# Patient Record
Sex: MALE | Race: WHITE | NOT HISPANIC OR LATINO | Employment: OTHER | URBAN - METROPOLITAN AREA
[De-identification: names, ages, dates, MRNs, and addresses within clinical notes are randomized per-mention and may not be internally consistent; named-entity substitution may affect disease eponyms.]

---

## 2019-05-30 ENCOUNTER — APPOINTMENT (OUTPATIENT)
Dept: PREADMISSION TESTING | Facility: HOSPITAL | Age: 54
End: 2019-05-30

## 2019-06-14 ENCOUNTER — APPOINTMENT (OUTPATIENT)
Dept: PREADMISSION TESTING | Facility: HOSPITAL | Age: 54
End: 2019-06-14

## 2019-06-14 VITALS — HEIGHT: 67 IN | WEIGHT: 241.84 LBS | BODY MASS INDEX: 37.96 KG/M2

## 2019-06-14 LAB
ABO GROUP BLD: NORMAL
BLD GP AB SCN SERPL QL: NEGATIVE
DEPRECATED RDW RBC AUTO: 44.1 FL (ref 37–54)
ERYTHROCYTE [DISTWIDTH] IN BLOOD BY AUTOMATED COUNT: 13 % (ref 12.3–15.4)
HBA1C MFR BLD: 5.2 % (ref 4.8–5.6)
HCT VFR BLD AUTO: 46.6 % (ref 37.5–51)
HGB BLD-MCNC: 15.3 G/DL (ref 13–17.7)
MCH RBC QN AUTO: 30.4 PG (ref 26.6–33)
MCHC RBC AUTO-ENTMCNC: 32.8 G/DL (ref 31.5–35.7)
MCV RBC AUTO: 92.6 FL (ref 79–97)
PLATELET # BLD AUTO: 207 10*3/MM3 (ref 140–450)
PMV BLD AUTO: 8.8 FL (ref 6–12)
RBC # BLD AUTO: 5.03 10*6/MM3 (ref 4.14–5.8)
RH BLD: POSITIVE
WBC NRBC COR # BLD: 9.95 10*3/MM3 (ref 3.4–10.8)

## 2019-06-14 PROCEDURE — 83036 HEMOGLOBIN GLYCOSYLATED A1C: CPT | Performed by: ANESTHESIOLOGY

## 2019-06-14 PROCEDURE — 36415 COLL VENOUS BLD VENIPUNCTURE: CPT

## 2019-06-14 PROCEDURE — 86850 RBC ANTIBODY SCREEN: CPT | Performed by: ORTHOPAEDIC SURGERY

## 2019-06-14 PROCEDURE — 86901 BLOOD TYPING SEROLOGIC RH(D): CPT | Performed by: ORTHOPAEDIC SURGERY

## 2019-06-14 PROCEDURE — 86900 BLOOD TYPING SEROLOGIC ABO: CPT | Performed by: ORTHOPAEDIC SURGERY

## 2019-06-14 PROCEDURE — 85027 COMPLETE CBC AUTOMATED: CPT | Performed by: ANESTHESIOLOGY

## 2019-06-14 RX ORDER — LEVOTHYROXINE SODIUM 0.05 MG/1
50 TABLET ORAL DAILY
COMMUNITY

## 2019-06-14 RX ORDER — ONDANSETRON 4 MG/1
4 TABLET, FILM COATED ORAL EVERY 8 HOURS PRN
COMMUNITY

## 2019-06-14 RX ORDER — FLUTICASONE PROPIONATE 50 MCG
2 SPRAY, SUSPENSION (ML) NASAL DAILY
COMMUNITY

## 2019-06-14 RX ORDER — METOPROLOL TARTRATE 100 MG/1
100 TABLET ORAL 2 TIMES DAILY
COMMUNITY

## 2019-06-14 RX ORDER — DULOXETIN HYDROCHLORIDE 60 MG/1
60 CAPSULE, DELAYED RELEASE ORAL DAILY
COMMUNITY

## 2019-06-14 RX ORDER — SIMVASTATIN 20 MG
20 TABLET ORAL DAILY
COMMUNITY

## 2019-06-14 RX ORDER — ALLOPURINOL 300 MG/1
300 TABLET ORAL DAILY
COMMUNITY

## 2019-06-14 RX ORDER — CYCLOBENZAPRINE HCL 10 MG
10 TABLET ORAL 3 TIMES DAILY PRN
COMMUNITY

## 2019-06-14 NOTE — PAT
Patient instructed to drink 20 ounces (or until full) of Gatorade or 20 ounces of G2 (if diabetic) and complete 1 hour before arrival time for procedure (NO RED Gatorade or G2)    Patient verbalized understanding.      T&s too early for pat so please do in preop.       Potassium dos as pt takes antihypertensives.      Per Anesthesia Request, patient instructed not to take their ACE/ARB medications on the AM of surgery.

## 2019-06-18 ENCOUNTER — ANESTHESIA EVENT (OUTPATIENT)
Dept: PERIOP | Facility: HOSPITAL | Age: 54
End: 2019-06-18

## 2019-06-19 ENCOUNTER — HOSPITAL ENCOUNTER (INPATIENT)
Facility: HOSPITAL | Age: 54
LOS: 3 days | Discharge: HOME HEALTH | End: 2019-06-22
Attending: ORTHOPAEDIC SURGERY | Admitting: ORTHOPAEDIC SURGERY

## 2019-06-19 ENCOUNTER — ANESTHESIA (OUTPATIENT)
Dept: PERIOP | Facility: HOSPITAL | Age: 54
End: 2019-06-19

## 2019-06-19 ENCOUNTER — APPOINTMENT (OUTPATIENT)
Dept: GENERAL RADIOLOGY | Facility: HOSPITAL | Age: 54
End: 2019-06-19

## 2019-06-19 DIAGNOSIS — Z98.1 S/P LUMBAR FUSION: ICD-10-CM

## 2019-06-19 DIAGNOSIS — Z74.09 IMPAIRED MOBILITY AND ADLS: ICD-10-CM

## 2019-06-19 DIAGNOSIS — Z78.9 IMPAIRED MOBILITY AND ADLS: ICD-10-CM

## 2019-06-19 DIAGNOSIS — Z74.09 IMPAIRED FUNCTIONAL MOBILITY, BALANCE, GAIT, AND ENDURANCE: Primary | ICD-10-CM

## 2019-06-19 PROBLEM — E78.5 HLD (HYPERLIPIDEMIA): Status: ACTIVE | Noted: 2019-06-19

## 2019-06-19 PROBLEM — M54.9 BACK PAIN: Status: ACTIVE | Noted: 2019-06-19

## 2019-06-19 LAB
ABO GROUP BLD: NORMAL
BLD GP AB SCN SERPL QL: NEGATIVE
POTASSIUM BLD-SCNC: 4 MMOL/L (ref 3.5–5.2)
RH BLD: POSITIVE
T&S EXPIRATION DATE: NORMAL

## 2019-06-19 PROCEDURE — 25010000002 PHENYLEPHRINE PER 1 ML: Performed by: NURSE ANESTHETIST, CERTIFIED REGISTERED

## 2019-06-19 PROCEDURE — 84132 ASSAY OF SERUM POTASSIUM: CPT | Performed by: ANESTHESIOLOGY

## 2019-06-19 PROCEDURE — 25010000002 SUCCINYLCHOLINE PER 20 MG: Performed by: NURSE ANESTHETIST, CERTIFIED REGISTERED

## 2019-06-19 PROCEDURE — 25010000002 HYDROMORPHONE 1 MG/ML SOLUTION: Performed by: ORTHOPAEDIC SURGERY

## 2019-06-19 PROCEDURE — 25010000002 HYDROMORPHONE PER 4 MG: Performed by: NURSE ANESTHETIST, CERTIFIED REGISTERED

## 2019-06-19 PROCEDURE — C1713 ANCHOR/SCREW BN/BN,TIS/BN: HCPCS | Performed by: ORTHOPAEDIC SURGERY

## 2019-06-19 PROCEDURE — 97116 GAIT TRAINING THERAPY: CPT

## 2019-06-19 PROCEDURE — 86900 BLOOD TYPING SEROLOGIC ABO: CPT | Performed by: ORTHOPAEDIC SURGERY

## 2019-06-19 PROCEDURE — 25010000002 PROPOFOL 10 MG/ML EMULSION: Performed by: NURSE ANESTHETIST, CERTIFIED REGISTERED

## 2019-06-19 PROCEDURE — P9041 ALBUMIN (HUMAN),5%, 50ML: HCPCS | Performed by: NURSE ANESTHETIST, CERTIFIED REGISTERED

## 2019-06-19 PROCEDURE — 0SG0071 FUSION OF LUMBAR VERTEBRAL JOINT WITH AUTOLOGOUS TISSUE SUBSTITUTE, POSTERIOR APPROACH, POSTERIOR COLUMN, OPEN APPROACH: ICD-10-PCS | Performed by: ORTHOPAEDIC SURGERY

## 2019-06-19 PROCEDURE — 25010000002 ALBUMIN HUMAN 5% PER 50 ML: Performed by: NURSE ANESTHETIST, CERTIFIED REGISTERED

## 2019-06-19 PROCEDURE — 76000 FLUOROSCOPY <1 HR PHYS/QHP: CPT

## 2019-06-19 PROCEDURE — 25010000002 DEXAMETHASONE PER 1 MG: Performed by: NURSE ANESTHETIST, CERTIFIED REGISTERED

## 2019-06-19 PROCEDURE — 25810000003 SODIUM CHLORIDE 0.9 % WITH KCL 20 MEQ 20-0.9 MEQ/L-% SOLUTION: Performed by: ORTHOPAEDIC SURGERY

## 2019-06-19 PROCEDURE — 97161 PT EVAL LOW COMPLEX 20 MIN: CPT

## 2019-06-19 PROCEDURE — 0SG00AJ FUSION OF LUMBAR VERTEBRAL JOINT WITH INTERBODY FUSION DEVICE, POSTERIOR APPROACH, ANTERIOR COLUMN, OPEN APPROACH: ICD-10-PCS | Performed by: ORTHOPAEDIC SURGERY

## 2019-06-19 PROCEDURE — 25010000002 MORPHINE PER 10 MG: Performed by: ORTHOPAEDIC SURGERY

## 2019-06-19 PROCEDURE — 25010000002 ONDANSETRON PER 1 MG: Performed by: NURSE ANESTHETIST, CERTIFIED REGISTERED

## 2019-06-19 PROCEDURE — 0ST20ZZ RESECTION OF LUMBAR VERTEBRAL DISC, OPEN APPROACH: ICD-10-PCS | Performed by: ORTHOPAEDIC SURGERY

## 2019-06-19 PROCEDURE — 86901 BLOOD TYPING SEROLOGIC RH(D): CPT | Performed by: ORTHOPAEDIC SURGERY

## 2019-06-19 PROCEDURE — 86850 RBC ANTIBODY SCREEN: CPT | Performed by: ORTHOPAEDIC SURGERY

## 2019-06-19 PROCEDURE — 25010000002 VANCOMYCIN 10 G RECONSTITUTED SOLUTION: Performed by: ORTHOPAEDIC SURGERY

## 2019-06-19 DEVICE — SCRW CORT VIPER PLS5.5 TI FIX 6X45MM: Type: IMPLANTABLE DEVICE | Status: FUNCTIONAL

## 2019-06-19 DEVICE — SCRW CORT VIPER PLS5.5 TI FIX 6X40MM: Type: IMPLANTABLE DEVICE | Status: FUNCTIONAL

## 2019-06-19 DEVICE — SPACR OPAL REVOLVE 10X28X11MM: Type: IMPLANTABLE DEVICE | Status: FUNCTIONAL

## 2019-06-19 DEVICE — ROD PREBNT SPINE EXPEDIUM TI 5.5X35MM: Type: IMPLANTABLE DEVICE | Status: FUNCTIONAL

## 2019-06-19 DEVICE — ALLOGRFT BONE VIVIGEN CELLUAR MATRX FORMABLE 5CC: Type: IMPLANTABLE DEVICE | Site: SPINE LUMBAR | Status: FUNCTIONAL

## 2019-06-19 RX ORDER — PROMETHAZINE HYDROCHLORIDE 25 MG/ML
6.25 INJECTION, SOLUTION INTRAMUSCULAR; INTRAVENOUS ONCE AS NEEDED
Status: DISCONTINUED | OUTPATIENT
Start: 2019-06-19 | End: 2019-06-19 | Stop reason: HOSPADM

## 2019-06-19 RX ORDER — ONDANSETRON 2 MG/ML
4 INJECTION INTRAMUSCULAR; INTRAVENOUS EVERY 6 HOURS PRN
Status: DISCONTINUED | OUTPATIENT
Start: 2019-06-19 | End: 2019-06-22 | Stop reason: HOSPADM

## 2019-06-19 RX ORDER — ONDANSETRON 2 MG/ML
4 INJECTION INTRAMUSCULAR; INTRAVENOUS EVERY 6 HOURS PRN
Status: DISCONTINUED | OUTPATIENT
Start: 2019-06-19 | End: 2019-06-19 | Stop reason: SDUPTHER

## 2019-06-19 RX ORDER — DIPHENHYDRAMINE HCL 50 MG
50 CAPSULE ORAL EVERY 6 HOURS PRN
Status: DISCONTINUED | OUTPATIENT
Start: 2019-06-19 | End: 2019-06-22 | Stop reason: HOSPADM

## 2019-06-19 RX ORDER — BISACODYL 5 MG/1
5 TABLET, DELAYED RELEASE ORAL DAILY PRN
Status: DISCONTINUED | OUTPATIENT
Start: 2019-06-19 | End: 2019-06-22 | Stop reason: HOSPADM

## 2019-06-19 RX ORDER — GLYCOPYRROLATE 0.2 MG/ML
INJECTION INTRAMUSCULAR; INTRAVENOUS AS NEEDED
Status: DISCONTINUED | OUTPATIENT
Start: 2019-06-19 | End: 2019-06-19 | Stop reason: SURG

## 2019-06-19 RX ORDER — LEVOTHYROXINE SODIUM 0.05 MG/1
50 TABLET ORAL
Status: DISCONTINUED | OUTPATIENT
Start: 2019-06-20 | End: 2019-06-22 | Stop reason: HOSPADM

## 2019-06-19 RX ORDER — OXYCODONE AND ACETAMINOPHEN 10; 325 MG/1; MG/1
1 TABLET ORAL EVERY 4 HOURS PRN
Status: DISCONTINUED | OUTPATIENT
Start: 2019-06-19 | End: 2019-06-22 | Stop reason: HOSPADM

## 2019-06-19 RX ORDER — VECURONIUM BROMIDE 1 MG/ML
INJECTION, POWDER, LYOPHILIZED, FOR SOLUTION INTRAVENOUS AS NEEDED
Status: DISCONTINUED | OUTPATIENT
Start: 2019-06-19 | End: 2019-06-19 | Stop reason: SURG

## 2019-06-19 RX ORDER — ZOLPIDEM TARTRATE 5 MG/1
5 TABLET ORAL NIGHTLY PRN
Status: DISCONTINUED | OUTPATIENT
Start: 2019-06-19 | End: 2019-06-22 | Stop reason: HOSPADM

## 2019-06-19 RX ORDER — MORPHINE SULFATE 2 MG/ML
1 INJECTION, SOLUTION INTRAMUSCULAR; INTRAVENOUS EVERY 4 HOURS PRN
Status: DISCONTINUED | OUTPATIENT
Start: 2019-06-19 | End: 2019-06-22 | Stop reason: HOSPADM

## 2019-06-19 RX ORDER — LIDOCAINE HYDROCHLORIDE 10 MG/ML
0.5 INJECTION, SOLUTION EPIDURAL; INFILTRATION; INTRACAUDAL; PERINEURAL ONCE AS NEEDED
Status: COMPLETED | OUTPATIENT
Start: 2019-06-19 | End: 2019-06-19

## 2019-06-19 RX ORDER — ESMOLOL HYDROCHLORIDE 10 MG/ML
INJECTION INTRAVENOUS AS NEEDED
Status: DISCONTINUED | OUTPATIENT
Start: 2019-06-19 | End: 2019-06-19 | Stop reason: SURG

## 2019-06-19 RX ORDER — FENTANYL CITRATE 50 UG/ML
50 INJECTION, SOLUTION INTRAMUSCULAR; INTRAVENOUS
Status: DISCONTINUED | OUTPATIENT
Start: 2019-06-19 | End: 2019-06-19 | Stop reason: HOSPADM

## 2019-06-19 RX ORDER — LISINOPRIL 10 MG/1
10 TABLET ORAL DAILY
Status: DISCONTINUED | OUTPATIENT
Start: 2019-06-19 | End: 2019-06-22 | Stop reason: HOSPADM

## 2019-06-19 RX ORDER — METOPROLOL TARTRATE 100 MG/1
100 TABLET ORAL EVERY 12 HOURS SCHEDULED
Status: DISCONTINUED | OUTPATIENT
Start: 2019-06-19 | End: 2019-06-22 | Stop reason: HOSPADM

## 2019-06-19 RX ORDER — BUPIVACAINE HYDROCHLORIDE AND EPINEPHRINE 2.5; 5 MG/ML; UG/ML
INJECTION, SOLUTION EPIDURAL; INFILTRATION; INTRACAUDAL; PERINEURAL AS NEEDED
Status: DISCONTINUED | OUTPATIENT
Start: 2019-06-19 | End: 2019-06-19 | Stop reason: HOSPADM

## 2019-06-19 RX ORDER — PROMETHAZINE HYDROCHLORIDE 25 MG/1
25 SUPPOSITORY RECTAL ONCE AS NEEDED
Status: DISCONTINUED | OUTPATIENT
Start: 2019-06-19 | End: 2019-06-19 | Stop reason: HOSPADM

## 2019-06-19 RX ORDER — PROMETHAZINE HYDROCHLORIDE 25 MG/1
25 TABLET ORAL ONCE AS NEEDED
Status: DISCONTINUED | OUTPATIENT
Start: 2019-06-19 | End: 2019-06-19 | Stop reason: HOSPADM

## 2019-06-19 RX ORDER — NALOXONE HCL 0.4 MG/ML
0.4 VIAL (ML) INJECTION
Status: DISCONTINUED | OUTPATIENT
Start: 2019-06-19 | End: 2019-06-22 | Stop reason: HOSPADM

## 2019-06-19 RX ORDER — SODIUM CHLORIDE, SODIUM LACTATE, POTASSIUM CHLORIDE, CALCIUM CHLORIDE 600; 310; 30; 20 MG/100ML; MG/100ML; MG/100ML; MG/100ML
9 INJECTION, SOLUTION INTRAVENOUS CONTINUOUS PRN
Status: DISCONTINUED | OUTPATIENT
Start: 2019-06-19 | End: 2019-06-19 | Stop reason: HOSPADM

## 2019-06-19 RX ORDER — DEXAMETHASONE SODIUM PHOSPHATE 4 MG/ML
INJECTION, SOLUTION INTRA-ARTICULAR; INTRALESIONAL; INTRAMUSCULAR; INTRAVENOUS; SOFT TISSUE AS NEEDED
Status: DISCONTINUED | OUTPATIENT
Start: 2019-06-19 | End: 2019-06-19 | Stop reason: SURG

## 2019-06-19 RX ORDER — ACETAMINOPHEN 500 MG
1000 TABLET ORAL ONCE
Status: COMPLETED | OUTPATIENT
Start: 2019-06-19 | End: 2019-06-19

## 2019-06-19 RX ORDER — PROPOFOL 10 MG/ML
VIAL (ML) INTRAVENOUS AS NEEDED
Status: DISCONTINUED | OUTPATIENT
Start: 2019-06-19 | End: 2019-06-19 | Stop reason: SURG

## 2019-06-19 RX ORDER — COLCHICINE 0.6 MG/1
0.6 TABLET ORAL DAILY
Status: DISCONTINUED | OUTPATIENT
Start: 2019-06-19 | End: 2019-06-22 | Stop reason: HOSPADM

## 2019-06-19 RX ORDER — SODIUM CHLORIDE 0.9 % (FLUSH) 0.9 %
3-10 SYRINGE (ML) INJECTION AS NEEDED
Status: DISCONTINUED | OUTPATIENT
Start: 2019-06-19 | End: 2019-06-22 | Stop reason: HOSPADM

## 2019-06-19 RX ORDER — SUFENTANIL CITRATE 50 UG/ML
INJECTION EPIDURAL; INTRAVENOUS AS NEEDED
Status: DISCONTINUED | OUTPATIENT
Start: 2019-06-19 | End: 2019-06-19 | Stop reason: SURG

## 2019-06-19 RX ORDER — FAMOTIDINE 10 MG/ML
20 INJECTION, SOLUTION INTRAVENOUS EVERY 12 HOURS SCHEDULED
Status: DISCONTINUED | OUTPATIENT
Start: 2019-06-19 | End: 2019-06-22 | Stop reason: HOSPADM

## 2019-06-19 RX ORDER — CETIRIZINE HYDROCHLORIDE 10 MG/1
10 TABLET ORAL DAILY
Status: DISCONTINUED | OUTPATIENT
Start: 2019-06-19 | End: 2019-06-22 | Stop reason: HOSPADM

## 2019-06-19 RX ORDER — SUCCINYLCHOLINE CHLORIDE 20 MG/ML
INJECTION INTRAMUSCULAR; INTRAVENOUS AS NEEDED
Status: DISCONTINUED | OUTPATIENT
Start: 2019-06-19 | End: 2019-06-19 | Stop reason: SURG

## 2019-06-19 RX ORDER — CYCLOBENZAPRINE HCL 10 MG
10 TABLET ORAL 3 TIMES DAILY PRN
Status: DISCONTINUED | OUTPATIENT
Start: 2019-06-19 | End: 2019-06-22 | Stop reason: HOSPADM

## 2019-06-19 RX ORDER — DULOXETIN HYDROCHLORIDE 60 MG/1
60 CAPSULE, DELAYED RELEASE ORAL DAILY
Status: DISCONTINUED | OUTPATIENT
Start: 2019-06-20 | End: 2019-06-22 | Stop reason: HOSPADM

## 2019-06-19 RX ORDER — SODIUM CHLORIDE AND POTASSIUM CHLORIDE 150; 900 MG/100ML; MG/100ML
100 INJECTION, SOLUTION INTRAVENOUS CONTINUOUS
Status: DISCONTINUED | OUTPATIENT
Start: 2019-06-19 | End: 2019-06-22 | Stop reason: HOSPADM

## 2019-06-19 RX ORDER — SODIUM CHLORIDE 0.9 % (FLUSH) 0.9 %
3 SYRINGE (ML) INJECTION EVERY 12 HOURS SCHEDULED
Status: DISCONTINUED | OUTPATIENT
Start: 2019-06-19 | End: 2019-06-19 | Stop reason: HOSPADM

## 2019-06-19 RX ORDER — FLUTICASONE PROPIONATE 50 MCG
2 SPRAY, SUSPENSION (ML) NASAL DAILY PRN
Status: DISCONTINUED | OUTPATIENT
Start: 2019-06-19 | End: 2019-06-22 | Stop reason: HOSPADM

## 2019-06-19 RX ORDER — PREGABALIN 75 MG/1
75 CAPSULE ORAL ONCE
Status: COMPLETED | OUTPATIENT
Start: 2019-06-19 | End: 2019-06-19

## 2019-06-19 RX ORDER — ONDANSETRON 2 MG/ML
INJECTION INTRAMUSCULAR; INTRAVENOUS AS NEEDED
Status: DISCONTINUED | OUTPATIENT
Start: 2019-06-19 | End: 2019-06-19 | Stop reason: SURG

## 2019-06-19 RX ORDER — LIDOCAINE HYDROCHLORIDE 10 MG/ML
INJECTION, SOLUTION INFILTRATION; PERINEURAL AS NEEDED
Status: DISCONTINUED | OUTPATIENT
Start: 2019-06-19 | End: 2019-06-19 | Stop reason: SURG

## 2019-06-19 RX ORDER — OXYCODONE HCL 10 MG/1
10 TABLET, FILM COATED, EXTENDED RELEASE ORAL ONCE
Status: COMPLETED | OUTPATIENT
Start: 2019-06-19 | End: 2019-06-19

## 2019-06-19 RX ORDER — SODIUM CHLORIDE 0.9 % (FLUSH) 0.9 %
3 SYRINGE (ML) INJECTION EVERY 12 HOURS SCHEDULED
Status: DISCONTINUED | OUTPATIENT
Start: 2019-06-19 | End: 2019-06-22 | Stop reason: HOSPADM

## 2019-06-19 RX ORDER — ATORVASTATIN CALCIUM 10 MG/1
10 TABLET, FILM COATED ORAL NIGHTLY
Status: DISCONTINUED | OUTPATIENT
Start: 2019-06-19 | End: 2019-06-22 | Stop reason: HOSPADM

## 2019-06-19 RX ORDER — ALBUMIN, HUMAN INJ 5% 5 %
SOLUTION INTRAVENOUS CONTINUOUS PRN
Status: DISCONTINUED | OUTPATIENT
Start: 2019-06-19 | End: 2019-06-19 | Stop reason: SURG

## 2019-06-19 RX ORDER — LABETALOL HYDROCHLORIDE 5 MG/ML
10 INJECTION, SOLUTION INTRAVENOUS EVERY 4 HOURS PRN
Status: DISCONTINUED | OUTPATIENT
Start: 2019-06-19 | End: 2019-06-22 | Stop reason: HOSPADM

## 2019-06-19 RX ORDER — ALLOPURINOL 300 MG/1
300 TABLET ORAL DAILY
Status: DISCONTINUED | OUTPATIENT
Start: 2019-06-19 | End: 2019-06-22 | Stop reason: HOSPADM

## 2019-06-19 RX ORDER — BISACODYL 10 MG
10 SUPPOSITORY, RECTAL RECTAL DAILY PRN
Status: DISCONTINUED | OUTPATIENT
Start: 2019-06-19 | End: 2019-06-22 | Stop reason: HOSPADM

## 2019-06-19 RX ORDER — ACETAMINOPHEN 325 MG/1
650 TABLET ORAL EVERY 4 HOURS PRN
Status: DISCONTINUED | OUTPATIENT
Start: 2019-06-19 | End: 2019-06-22 | Stop reason: HOSPADM

## 2019-06-19 RX ORDER — NEOSTIGMINE METHYLSULFATE 5 MG/5 ML
SYRINGE (ML) INTRAVENOUS AS NEEDED
Status: DISCONTINUED | OUTPATIENT
Start: 2019-06-19 | End: 2019-06-19 | Stop reason: SURG

## 2019-06-19 RX ORDER — HYDROMORPHONE HYDROCHLORIDE 1 MG/ML
0.5 INJECTION, SOLUTION INTRAMUSCULAR; INTRAVENOUS; SUBCUTANEOUS
Status: DISCONTINUED | OUTPATIENT
Start: 2019-06-19 | End: 2019-06-19 | Stop reason: HOSPADM

## 2019-06-19 RX ORDER — SODIUM CHLORIDE 0.9 % (FLUSH) 0.9 %
1-10 SYRINGE (ML) INJECTION AS NEEDED
Status: DISCONTINUED | OUTPATIENT
Start: 2019-06-19 | End: 2019-06-19 | Stop reason: HOSPADM

## 2019-06-19 RX ORDER — FAMOTIDINE 20 MG/1
20 TABLET, FILM COATED ORAL
Status: DISCONTINUED | OUTPATIENT
Start: 2019-06-19 | End: 2019-06-19 | Stop reason: HOSPADM

## 2019-06-19 RX ORDER — FAMOTIDINE 20 MG/1
20 TABLET, FILM COATED ORAL EVERY 12 HOURS SCHEDULED
Status: DISCONTINUED | OUTPATIENT
Start: 2019-06-19 | End: 2019-06-22 | Stop reason: HOSPADM

## 2019-06-19 RX ADMIN — Medication 3 MG: at 10:54

## 2019-06-19 RX ADMIN — PHENYLEPHRINE HYDROCHLORIDE 200 MCG: 10 INJECTION INTRAVENOUS at 08:34

## 2019-06-19 RX ADMIN — ACETAMINOPHEN 1000 MG: 500 TABLET ORAL at 06:44

## 2019-06-19 RX ADMIN — ALLOPURINOL 300 MG: 300 TABLET ORAL at 13:58

## 2019-06-19 RX ADMIN — HYDROMORPHONE HYDROCHLORIDE 0.5 MG: 1 INJECTION, SOLUTION INTRAMUSCULAR; INTRAVENOUS; SUBCUTANEOUS at 11:51

## 2019-06-19 RX ADMIN — METOPROLOL TARTRATE 100 MG: 100 TABLET ORAL at 20:44

## 2019-06-19 RX ADMIN — OXYCODONE HYDROCHLORIDE 10 MG: 10 TABLET, FILM COATED, EXTENDED RELEASE ORAL at 06:44

## 2019-06-19 RX ADMIN — METOPROLOL TARTRATE 100 MG: 100 TABLET ORAL at 13:58

## 2019-06-19 RX ADMIN — MUPIROCIN 1 APPLICATION: 20 OINTMENT TOPICAL at 06:44

## 2019-06-19 RX ADMIN — PHENYLEPHRINE HYDROCHLORIDE 0.5 MCG/KG/MIN: 10 INJECTION INTRAVENOUS at 08:38

## 2019-06-19 RX ADMIN — CETIRIZINE HYDROCHLORIDE 10 MG: 10 TABLET, FILM COATED ORAL at 13:58

## 2019-06-19 RX ADMIN — HYDROMORPHONE HYDROCHLORIDE 0.5 MG: 1 INJECTION, SOLUTION INTRAMUSCULAR; INTRAVENOUS; SUBCUTANEOUS at 11:39

## 2019-06-19 RX ADMIN — ATORVASTATIN CALCIUM 10 MG: 10 TABLET, FILM COATED ORAL at 20:44

## 2019-06-19 RX ADMIN — SUCCINYLCHOLINE CHLORIDE 200 MG: 20 INJECTION, SOLUTION INTRAMUSCULAR; INTRAVENOUS; PARENTERAL at 07:39

## 2019-06-19 RX ADMIN — VECURONIUM BROMIDE 2 MG: 1 INJECTION, POWDER, LYOPHILIZED, FOR SOLUTION INTRAVENOUS at 07:39

## 2019-06-19 RX ADMIN — OXYCODONE HYDROCHLORIDE AND ACETAMINOPHEN 1 TABLET: 10; 325 TABLET ORAL at 22:03

## 2019-06-19 RX ADMIN — ALBUMIN HUMAN: 0.05 INJECTION, SOLUTION INTRAVENOUS at 10:29

## 2019-06-19 RX ADMIN — FAMOTIDINE 20 MG: 20 TABLET, FILM COATED ORAL at 06:44

## 2019-06-19 RX ADMIN — ONDANSETRON 4 MG: 2 INJECTION INTRAMUSCULAR; INTRAVENOUS at 10:48

## 2019-06-19 RX ADMIN — SODIUM CHLORIDE, POTASSIUM CHLORIDE, SODIUM LACTATE AND CALCIUM CHLORIDE 9 ML/HR: 600; 310; 30; 20 INJECTION, SOLUTION INTRAVENOUS at 07:01

## 2019-06-19 RX ADMIN — PHENYLEPHRINE HYDROCHLORIDE 100 MCG: 10 INJECTION INTRAVENOUS at 08:12

## 2019-06-19 RX ADMIN — LISINOPRIL 10 MG: 10 TABLET ORAL at 13:58

## 2019-06-19 RX ADMIN — SUFENTANIL CITRATE 10 MCG: 50 INJECTION EPIDURAL; INTRAVENOUS at 11:18

## 2019-06-19 RX ADMIN — DEXAMETHASONE SODIUM PHOSPHATE 8 MG: 4 INJECTION, SOLUTION INTRAMUSCULAR; INTRAVENOUS at 07:55

## 2019-06-19 RX ADMIN — PHENYLEPHRINE HYDROCHLORIDE 200 MCG: 10 INJECTION INTRAVENOUS at 08:15

## 2019-06-19 RX ADMIN — LIDOCAINE HYDROCHLORIDE 0.3 ML: 10 INJECTION, SOLUTION EPIDURAL; INFILTRATION; INTRACAUDAL; PERINEURAL at 06:44

## 2019-06-19 RX ADMIN — HYDROMORPHONE HYDROCHLORIDE 0.5 MG: 1 INJECTION, SOLUTION INTRAMUSCULAR; INTRAVENOUS; SUBCUTANEOUS at 12:19

## 2019-06-19 RX ADMIN — PHENYLEPHRINE HYDROCHLORIDE 200 MCG: 10 INJECTION INTRAVENOUS at 08:35

## 2019-06-19 RX ADMIN — ALBUMIN HUMAN: 0.05 INJECTION, SOLUTION INTRAVENOUS at 09:32

## 2019-06-19 RX ADMIN — OXYCODONE HYDROCHLORIDE AND ACETAMINOPHEN 1 TABLET: 10; 325 TABLET ORAL at 17:44

## 2019-06-19 RX ADMIN — VANCOMYCIN HYDROCHLORIDE 1750 MG: 10 INJECTION, POWDER, LYOPHILIZED, FOR SOLUTION INTRAVENOUS at 06:58

## 2019-06-19 RX ADMIN — POTASSIUM CHLORIDE AND SODIUM CHLORIDE 100 ML/HR: 900; 150 INJECTION, SOLUTION INTRAVENOUS at 12:56

## 2019-06-19 RX ADMIN — VANCOMYCIN HYDROCHLORIDE 1750 MG: 10 INJECTION, POWDER, LYOPHILIZED, FOR SOLUTION INTRAVENOUS at 20:44

## 2019-06-19 RX ADMIN — PREGABALIN 75 MG: 75 CAPSULE ORAL at 06:44

## 2019-06-19 RX ADMIN — GLYCOPYRROLATE 0.4 MG: 0.2 INJECTION, SOLUTION INTRAMUSCULAR; INTRAVENOUS at 10:54

## 2019-06-19 RX ADMIN — OXYCODONE HYDROCHLORIDE AND ACETAMINOPHEN 1 TABLET: 10; 325 TABLET ORAL at 14:01

## 2019-06-19 RX ADMIN — MORPHINE SULFATE 1 MG: 2 INJECTION, SOLUTION INTRAMUSCULAR; INTRAVENOUS at 20:44

## 2019-06-19 RX ADMIN — ESMOLOL HYDROCHLORIDE 30 MG: 10 INJECTION INTRAVENOUS at 07:39

## 2019-06-19 RX ADMIN — SUFENTANIL CITRATE 20 MCG: 50 INJECTION EPIDURAL; INTRAVENOUS at 08:04

## 2019-06-19 RX ADMIN — COLCHICINE 0.6 MG: 0.6 TABLET, FILM COATED ORAL at 13:58

## 2019-06-19 RX ADMIN — VECURONIUM BROMIDE 4 MG: 1 INJECTION, POWDER, LYOPHILIZED, FOR SOLUTION INTRAVENOUS at 07:50

## 2019-06-19 RX ADMIN — LIDOCAINE HYDROCHLORIDE 100 MG: 10 INJECTION, SOLUTION INFILTRATION; PERINEURAL at 07:39

## 2019-06-19 RX ADMIN — PROPOFOL 200 MG: 10 INJECTION, EMULSION INTRAVENOUS at 07:39

## 2019-06-19 RX ADMIN — HYDROMORPHONE HYDROCHLORIDE 1 MG: 1 INJECTION, SOLUTION INTRAMUSCULAR; INTRAVENOUS; SUBCUTANEOUS at 15:26

## 2019-06-19 RX ADMIN — FAMOTIDINE 20 MG: 20 TABLET ORAL at 13:58

## 2019-06-19 RX ADMIN — FAMOTIDINE 20 MG: 20 TABLET ORAL at 20:44

## 2019-06-19 RX ADMIN — SODIUM CHLORIDE, PRESERVATIVE FREE 3 ML: 5 INJECTION INTRAVENOUS at 14:01

## 2019-06-19 RX ADMIN — SODIUM CHLORIDE, POTASSIUM CHLORIDE, SODIUM LACTATE AND CALCIUM CHLORIDE: 600; 310; 30; 20 INJECTION, SOLUTION INTRAVENOUS at 10:16

## 2019-06-19 RX ADMIN — MORPHINE SULFATE 1 MG: 2 INJECTION, SOLUTION INTRAMUSCULAR; INTRAVENOUS at 12:55

## 2019-06-19 NOTE — ANESTHESIA PROCEDURE NOTES
Airway  Urgency: elective    Airway not difficult    General Information and Staff    Patient location during procedure: OR  CRNA: Judi Modi CRNA    Indications and Patient Condition  Indications for airway management: airway protection    Preoxygenated: yes  MILS maintained throughout  Mask difficulty assessment: 0 - not attempted    Final Airway Details  Final airway type: endotracheal airway      Successful airway: ETT  Cuffed: yes   Successful intubation technique: direct laryngoscopy  Facilitating devices/methods: intubating stylet  Endotracheal tube insertion site: oral  Blade: Yoo  Blade size: 2  ETT size (mm): 8.0  Cormack-Lehane Classification: grade I - full view of glottis  Placement verified by: chest auscultation and capnometry   Cuff volume (mL): 6  Measured from: lips  ETT to lips (cm): 22  Number of attempts at approach: 1    Additional Comments  Pt to OR 13. Pt supine on stretcher, bilateral arms to side.  ASA monitors placed. Pre-O2 with 100% oxygen. SIVI with RSI secondary to potential difficult airway.  Atraumatic intubation. +ETCO2. +BBS. Pt positioned prone with surgeon and OR staff assistance after OETT secured.

## 2019-06-19 NOTE — ANESTHESIA PREPROCEDURE EVALUATION
Anesthesia Evaluation     Patient summary reviewed and Nursing notes reviewed   NPO Solid Status: > 8 hours  NPO Liquid Status: > 8 hours           Airway   Mallampati: III  TM distance: >3 FB  Neck ROM: limited  Possible difficult intubation  Dental      Pulmonary    (+) sleep apnea (probable),   (-) COPD, asthma, shortness of breath, not a smoker  Cardiovascular     Patient on routine beta blocker    (+) hypertension,   (-) past MI, CAD, dysrhythmias, angina, cardiac stents, CABG      Neuro/Psych  (-) seizures, CVA    ROS Comment: spondylolisthesis  GI/Hepatic/Renal/Endo    (+) morbid obesity,  hypothyroidism,   (-) liver disease, no renal disease, diabetes    Musculoskeletal     Abdominal    Substance History      OB/GYN          Other   (+) arthritis (rheuimatoid? )     ROS/Med Hx Other: Sp hip knee surg   avoided Propofol due to egg allergy according to patient     Last Hip surgery had propofol infusion documented                  Anesthesia Plan    ASA 3     general   (Glidescope )  intravenous induction   Anesthetic plan, all risks, benefits, and alternatives have been provided, discussed and informed consent has been obtained with: patient.    Plan discussed with CRNA.

## 2019-06-19 NOTE — H&P
Patient Name: Mckay Hunt  MRN: [id number]  : 1965  DOS: 2019    Attending: Milan Valdez MD    Primary Care Provider: Malick Cruz DO      Chief complaint:  Low back pain    Subjective   Patient is a 53 y.o. male presented for lumbar fusion by Dr. Valdez.    He underwent surgery under GA. He tolerated surgery well and is admitted for further medical management.     He is known to us from previous admission for left hip replacement in Dec 2016; which he recovered well.    PROCEDURE PERFORMED:  1.  Transforaminal lumbar interbody fusion, L4-L5, with DePuy interbody fusion cage and bone graft.  2.  Segmental instrumentation at L4-L5 with DePuy transpedicular fixation.  3.  Posterolateral fusion at L4-L5 with bone graft.  4.  Alatorre Barnes type osteotomy at L4-L5 to facilitate restoration of lordosis and reduction of spondylolisthesis.  5.  Harvesting of bone graft locally from spinous processes and lamina.    When seen postop he doing well. He is having moderate back pain. He denies nausea, shortness of breath or chest pain. No hx of DVT or PE.    (Above is noted, agree.  Doing well when seen in his room afterwards.  Some postoperative pain.  No pain down the lower extremities.  No shortness breath no chest pain.  Has not yet voided, has not yet ambulated.)wy    Allergies:  Allergies   Allergen Reactions   • Eggs Or Egg-Derived Products Swelling   • Mushroom Extract Complex Swelling   • Indocin [Indomethacin] Rash   • Other Rash     polyester   • Soybean-Containing Drug Products Rash   • Zosyn [Piperacillin Sod-Tazobactam So] Rash       Meds:  Medications Prior to Admission   Medication Sig Dispense Refill Last Dose   • allopurinol (ZYLOPRIM) 300 MG tablet Take 300 mg by mouth Daily.   2019 at 1000   • colchicine 0.6 MG capsule capsule Take 0.6 mg by mouth Daily.   Past Week at Unknown time   • cyclobenzaprine (FLEXERIL) 10 MG tablet Take 10 mg by mouth 3 (Three) Times a Day As Needed  for Muscle Spasms.   6/18/2019 at 1700   • diphenhydrAMINE (BENADRYL) 25 mg capsule Take 50 mg by mouth Every 6 (Six) Hours As Needed for itching.   Past Week at Unknown time   • DULoxetine (CYMBALTA) 60 MG capsule Take 60 mg by mouth Daily.   6/19/2019 at 0330   • fluticasone (FLONASE) 50 MCG/ACT nasal spray 2 sprays into the nostril(s) as directed by provider Daily.   Past Week at Unknown time   • levothyroxine (SYNTHROID, LEVOTHROID) 50 MCG tablet Take 50 mcg by mouth Daily.   6/19/2019 at 0330   • lisinopril (PRINIVIL,ZESTRIL) 10 MG tablet Take 10 mg by mouth Daily.   6/18/2019 at 1000   • LORATADINE PO Take 10 mg by mouth Daily.   6/18/2019 at 1000   • metoprolol tartrate (LOPRESSOR) 100 MG tablet Take 100 mg by mouth 2 (Two) Times a Day.   6/18/2019 at 2130   • Multiple Vitamins-Minerals (MULTIVITAMIN ADULT PO) Take 1 capsule by mouth Daily.   6/18/2019 at 1000   • ondansetron (ZOFRAN) 4 MG tablet Take 4 mg by mouth Every 8 (Eight) Hours As Needed for Nausea or Vomiting.   Past Week at Unknown time   • simvastatin (ZOCOR) 20 MG tablet Take 20 mg by mouth Daily.   6/18/2019 at 2130       History:   Past Medical History:   Diagnosis Date   • Abnormal gait    • Anesthesia complication     perks pt up instead of downer,     trouble to put under   • Arthritis    • Cellulitis    • Disease of thyroid gland     hypothyroidism   • Gout    • Hip pain     left   • Hypertension    • Muscle weakness    • Spondylolisthesis    • Tinnitus    • Wears glasses     readers     Past Surgical History:   Procedure Laterality Date   • CARPAL TUNNEL RELEASE      bilat  x2   • ELBOW PROCEDURE      bilat    • JOINT REPLACEMENT Right 2010    HIP   • TONSILLECTOMY AND ADENOIDECTOMY      had to go in twice    • TOTAL HIP ARTHROPLASTY Left 12/19/2016    Procedure: TOTAL HIP ARTHROPLASTY ANTERIOR LEFT;  Surgeon: Bruce Berg MD;  Location[facility];  Service:    • WISDOM TOOTH EXTRACTION      al 4 removed      History reviewed. No  "pertinent family history.  Social History     Tobacco Use   • Smoking status: Never Smoker   • Smokeless tobacco: Never Used   Substance Use Topics   • Alcohol use: Yes     Alcohol/week: 3.6 oz     Types: 6 Cans of beer per week   • Drug use: No   He lives alone and has 1 child. He is trying to get disability.     Review of Systems  Pertinent items are noted in HPI, all other systems reviewed and negative    Vital Signs  Visit Vitals  /68   Pulse 93   Temp 98.1 °F (36.7 °C) (Axillary)   Resp 16   Ht 170.2 cm (67\")   Wt 109 kg (241 lb)   SpO2 93%   BMI 37.75 kg/m²       Physical Exam:    General Appearance:    Alert, cooperative, in no acute distress   Head:    Normocephalic, without obvious abnormality, atraumatic   Eyes:            Lids and lashes normal, conjunctivae and sclerae normal, no   icterus, no pallor, corneas clear   Ears:    Ears appear intact with no abnormalities noted   Back:   Surgical dressing CDI. HV present   Lungs:     Clear to auscultation,respirations regular, even and                   unlabored    Heart:    Regular rhythm and normal rate, normal S1 and S2, no            murmur, no gallop   Abdomen:     Normal bowel sounds, no masses, no organomegaly, soft        non-tender, non-distended, no guarding, no rebound                 Tenderness.  Obese.   Genitalia:    Deferred   Extremities:   Moves all extremities well, no edema, no cyanosis, no              redness   Pulses:   Pulses palpable and equal bilaterally   Skin:   No bleeding, bruising or rash   Neurologic:   Cranial nerves 2 - 12 grossly intact, sensation intact. Flexion and dorsiflexion intact bilateral feet.        I reviewed the patient's new clinical results.       Results from last 7 days   Lab Units 06/14/19  1409   WBC 10*3/mm3 9.95   HEMOGLOBIN g/dL 15.3   HEMATOCRIT % 46.6   PLATELETS 10*3/mm3 207     Results from last 7 days   Lab Units 06/19/19  0645   POTASSIUM mmol/L 4.0     Lab Results   Component Value Date    " HGBA1C 5.20 06/14/2019       Assessment and Plan:     S/P lumbar fusion    Back pain    Hypothyroid    HTN (hypertension)    HLD (hyperlipidemia)    Obesity    Plan  1. PT/OT- ambulate  2. Pain control-prns   3. IS-encourage  4. DVT proph- [facility]  5. Bowel regimen  6. Resume home medications as appropriate  7. Monitor post-op labs  8. Discharge planning     HTN, HLD  - Continue home lisinopril, lopressor and statin  - Monitor BP   - Holding parameters for BP meds  - Labetalol PRN for SBP>170    Hypothyroid  - Continue home synthroid      RADHA Pereira  06/19/19  2:07 PM     I have personally performed the evaluation on this patient. My history is consistent  with HPI obtained. My exam findings are listed above. I have personally reviewed and discussed the above formulated treatment plan with pt and DALLIN. Ella.

## 2019-06-19 NOTE — PROGRESS NOTES
Discharge Planning Assessment  [facility]     Patient Name: Mckay Hunt  MRN: [id number]  Today's Date: 6/19/2019    Admit Date: 6/19/2019    Discharge Needs Assessment     Row Name 06/19/19 1515       Living Environment    Lives With  alone    Current Living Arrangements  home/apartment/condo    Primary Care Provided by  self    Provides Primary Care For  no one    Family Caregiver if Needed  child(herberth), adult    Able to Return to Prior Arrangements  yes       Transition Planning    Patient/Family Anticipates Transition to  home    Transportation Anticipated  family or friend will provide       Discharge Needs Assessment    Equipment Currently Used at Home  rollator;cane, straight        Discharge Plan     Row Name 06/19/19 1515       Plan    Plan  Home +/- HH    Patient/Family in Agreement with Plan  yes    Plan Comments  Spoke with patient and dtr at bedside. He lives alone in a one story in [facility]. His dtr lives close. PTA he was independent with ADL's and used a cane or rollator for assist w/ mobility. Therapy to eval. He is interested in HH/PT/SN, but does not want a RW. He states he prefers his rollator. CM will make referrals pending therapy recs. He also stated that he uses [employer] for transportation. VM left w/ SW to assist with arranging. No other needs noted at this time. CM following.         Destination      No service coordination in this encounter.      Durable Medical Equipment      No service coordination in this encounter.      Dialysis/Infusion      No service coordination in this encounter.      Home Medical Care      No service coordination in this encounter.      Therapy      No service coordination in this encounter.      Community Resources      No service coordination in this encounter.          Demographic Summary     Row Name 06/19/19 1519       General Information    Arrived From  home    Reason for Consult  discharge planning        Functional Status     Row Name 06/19/19 1510        Functional Status    Usual Activity Tolerance  moderate    Current Activity Tolerance  fair        Psychosocial    No documentation.       Abuse/Neglect    No documentation.       Legal    No documentation.       Substance Abuse    No documentation.       Patient Forms    No documentation.           Julia Henriquez RN

## 2019-06-19 NOTE — H&P
Pre-Op H&P  Mckay Hunt  [id number]  1965    Chief complaint: low back pain    HPI:    Patient is a 53 y.o.male who presents with low back pain and bilateral buttock pain     Review of Systems:  General ROS: negative for chills, fever or skin lesions;  No changes since last office visit  Cardiovascular ROS: no chest pain or dyspnea on exertion  Respiratory ROS: no cough, shortness of breath, or wheezing    Allergies:   Allergies   Allergen Reactions   • Eggs Or Egg-Derived Products Swelling   • Mushroom Extract Complex Swelling   • Indocin [Indomethacin] Rash   • Other Rash     polyester   • Soybean-Containing Drug Products Rash   • Zosyn [Piperacillin Sod-Tazobactam So] Rash       Home Meds:    No current facility-administered medications on file prior to encounter.      Current Outpatient Medications on File Prior to Encounter   Medication Sig Dispense Refill   • colchicine 0.6 MG capsule capsule Take 0.6 mg by mouth Daily.     • diphenhydrAMINE (BENADRYL) 25 mg capsule Take 50 mg by mouth Every 6 (Six) Hours As Needed for itching.     • lisinopril (PRINIVIL,ZESTRIL) 10 MG tablet Take 10 mg by mouth Daily.         PMH:   Past Medical History:   Diagnosis Date   • Abnormal gait    • Anesthesia complication     perks pt up instead of downer,     trouble to put under   • Arthritis    • Cellulitis    • Disease of thyroid gland     hypothyroidism   • Gout    • Hip pain     left   • Hypertension    • Muscle weakness    • Spondylolisthesis    • Tinnitus    • Wears glasses     readers     PSH:    Past Surgical History:   Procedure Laterality Date   • CARPAL TUNNEL RELEASE      bilat  x2   • ELBOW PROCEDURE      bilat    • JOINT REPLACEMENT Right 2010    HIP   • TONSILLECTOMY AND ADENOIDECTOMY      had to go in twice    • TOTAL HIP ARTHROPLASTY Left 12/19/2016    Procedure: TOTAL HIP ARTHROPLASTY ANTERIOR LEFT;  Surgeon: Bruce Berg MD;  Location: [facility];  Service:    • WISDOM TOOTH EXTRACTION      al 4  "removed        Immunization History:  Influenza: no  Pneumococcal: no  Tetanus: yes    Social History:   Tobacco:   Social History     Tobacco Use   Smoking Status Never Smoker   Smokeless Tobacco Never Used      Alcohol:     Social History     Substance and Sexual Activity   Alcohol Use Yes   • Alcohol/week: 3.6 oz   • Types: 6 Cans of beer per week       Vitals:           /94 (BP Location: Right arm, Patient Position: Lying)   Pulse 92   Temp 97.8 °F (36.6 °C) (Temporal)   Resp 18   Ht 170.2 cm (67\")   Wt 109 kg (241 lb)   SpO2 96%   BMI 37.75 kg/m²     Physical Exam:  General Appearance:    Alert, cooperative, no distress, appears stated age   Head:    Normocephalic, without obvious abnormality, atraumatic   Lungs:     Clear to auscultation bilaterally, respirations unlabored    Heart:   Regular rate and rhythm, S1 and S2 normal, no murmur, rub    or gallop    Abdomen:    Soft, non-tender.  +bowel sounds   Breast Exam:    deferred   Genitalia:    deferred   Extremities:   Extremities normal, atraumatic, no cyanosis or edema   Skin:   Skin color, texture, turgor normal, no rashes or lesions   Neurologic:   Grossly intact   Results Review  LABS:  Lab Results   Component Value Date    WBC 9.95 06/14/2019    HGB 15.3 06/14/2019    HCT 46.6 06/14/2019    MCV 92.6 06/14/2019     06/14/2019    NEUTROABS 6.4 05/10/2019    GLUCOSE 89 12/22/2016    BUN 9 05/10/2019    CREATININE 0.9 05/10/2019    EGFRIFNONA 120 12/22/2016     05/10/2019    K 3.9 05/10/2019    CL 98 (L) 05/10/2019    CO2 22 05/10/2019    CALCIUM 9.4 05/10/2019    ALBUMIN 4.5 05/10/2019    AST 78 (H) 05/10/2019    ALT 52 05/10/2019    BILITOT 0.9 05/10/2019       RADIOLOGY:  Imaging Results (last 72 hours)     ** No results found for the last 72 hours. **          I reviewed the patient's new clinical results.    Cancer Staging (if applicable)  Cancer Patient: __ yes __no __unknown; If yes, clinical stage T:__ N:__M:__, stage group " or __N/A    Impression: 53 year old male presenting with L4-5 spondylolithesis    Plan: L4-5 decompression and fusion with instrumentation    NESHA Byers   6/19/2019   6:57 AM

## 2019-06-19 NOTE — OP NOTE
PREOPERATIVE DIAGNOSIS:  L4-L5 spondylolisthesis with radiculopathy.    POSTOPERATIVE DIAGNOSIS:  L4-L5 spondylolisthesis with radiculopathy.    PROCEDURE PERFORMED:  1.  Transforaminal lumbar interbody fusion, L4-L5, with DePuy interbody fusion cage and bone graft.  2.  Segmental instrumentation at L4-L5 with DePuy transpedicular fixation.  3.  Posterolateral fusion at L4-L5 with bone graft.  4.  Alatorre Barnes type osteotomy at L4-L5 to facilitate restoration of lordosis and reduction of spondylolisthesis.  5.  Harvesting of bone graft locally from spinous processes and lamina.    SURGEON:  Milan Valdez MD    ASSISTANT:  REBECA Banks    ANESTHESIA:  General.    OPERATIVE COURSE:  Patient was given a preoperative dose of intravenous vancomycin.  He was given a general anesthetic.  He was placed in the prone position.  The back was prepped and draped sterilely.  A midline incision was made.  The fascia was split.  The paraspinal musculature was elevated.  First pedicle screws were placed.  This was done under C-arm guidance.  I identified the pedicles of L4 and L5 bilaterally.  Pedicle screws were placed uneventfully.  They appeared in good position under C-arm.  They obtained good bony purchase.  Two rods contoured in lordosis were placed in the screws and the set screws were applied.  All hardware appeared in good position on C-arm.    Next, to facilitate reduction, I did a Alatorre Barnes type osteotomy.  The lower half of the lamina of L4 was removed.  The facets were removed.  This made the motion segment more mobile.    Next, an interbody fusion was performed.  This was done to the left side.  An annular window was created between the exiting nerve root above the traversing nerve root.  A complete diskectomy was performed using curettes and pituitaries and end plate daniela.  The cage selected was 28 in length and 11 mm in height.  Prior to placing the cage, I irrigated the disc space.  I packed bone graft  in the anterior disc space.  The cage was then impacted tangentially across the disc space and locked into place well.  Once the cage was in place, I applied compression across the screws to further lock the cage in place.  The set screws were then retightened.    A posterolateral fusion was performed.  The transverse processes of L4 and L5 were decorticated.  Bone graft was packed in the posterolateral gutters on the left and ride sides over L4 and L5.    It should be noted bone graft was harvested locally from the lamina and spinous processes.  This bone graft was morselized.  It was mixed with Vivigen Allograft.  The above material was used for the fusion.    Final C-arm images showed all hardware in good position.  A final torque tightening was done of the screws.  A Hemovac drain was placed.  Exposed dura was covered with thrombin soaked Gelfoam.  The wound appeared dry at completion.    The wound was closed in layers using Vicryl.  A sterile dressing was applied.  Patient was awakened and transported to recovery room in stable condition.

## 2019-06-19 NOTE — PAYOR COMM NOTE
"Mckay Hunt (53 y.o. Male)     Teri Judd, RN  [phone]  F [phone]    Initial clinicals    Date of Birth Social Security Number Address Home Phone MRN    1965  [id number]  [place] [phone] [id number]    Syn[employer] Marital Status          Anglican Single       Admission Date Admission Type Admitting Provider Attending Provider Department, Room/Bed    6/19/19 Elective Milan Valdez MD Vaughan, John J, MD [facility] 3G, S362/1    Discharge Date Discharge Disposition Discharge Destination                       Attending Provider:  Milan Valdez MD    Allergies:  Eggs Or Egg-derived Products, Mushroom Extract Complex, Indocin [Indomethacin], Other, Soybean-containing Drug Products, Zosyn [Piperacillin Sod-tazobactam So]    Isolation:  None   Infection:  None   Code Status:  CPR    Ht:  170.2 cm (67\")   Wt:  109 kg (241 lb)    Admission Cmt:  None   Principal Problem:  S/P lumbar fusion [Z98.1]                 Active Insurance as of 6/19/2019     Primary Coverage     Payor Plan Insurance Group Employer/Plan Group    [employer] MEDICAID [employer] [id number]     Payor Plan Address Payor Plan Phone Number Payor Plan Fax Number Effective Dates    PO  [phone]  12/14/2016 - None Entered    [facility] [id number]       Subscriber Name Subscriber Birth Date Member ID       MCKAY HUNT 1965 [id number]                 Emergency Contacts      (Rel.) Home Phone Work Phone Mobile Phone    Carolina Hunt (Daughter) -- -- [phone]               History & Physical      See, NESHA Babb at 6/19/2019  6:57 AM          Pre-Op H&P  Mckay Hutn  [id number]  1965    Chief complaint: low back pain    HPI:    Patient is a 53 y.o.male who presents with low back pain and bilateral buttock pain     Review of Systems:  General ROS: negative for chills, fever or skin lesions;  No changes since last office visit  Cardiovascular " ROS: no chest pain or dyspnea on exertion  Respiratory ROS: no cough, shortness of breath, or wheezing    Allergies:   Allergies   Allergen Reactions   • Eggs Or Egg-Derived Products Swelling   • Mushroom Extract Complex Swelling   • Indocin [Indomethacin] Rash   • Other Rash     polyester   • Soybean-Containing Drug Products Rash   • Zosyn [Piperacillin Sod-Tazobactam So] Rash       Home Meds:    No current facility-administered medications on file prior to encounter.      Current Outpatient Medications on File Prior to Encounter   Medication Sig Dispense Refill   • colchicine 0.6 MG capsule capsule Take 0.6 mg by mouth Daily.     • diphenhydrAMINE (BENADRYL) 25 mg capsule Take 50 mg by mouth Every 6 (Six) Hours As Needed for itching.     • lisinopril (PRINIVIL,ZESTRIL) 10 MG tablet Take 10 mg by mouth Daily.         PMH:   Past Medical History:   Diagnosis Date   • Abnormal gait    • Anesthesia complication     perks pt up instead of downer,     trouble to put under   • Arthritis    • Cellulitis    • Disease of thyroid gland     hypothyroidism   • Gout    • Hip pain     left   • Hypertension    • Muscle weakness    • Spondylolisthesis    • Tinnitus    • Wears glasses     readers     PSH:    Past Surgical History:   Procedure Laterality Date   • CARPAL TUNNEL RELEASE      bilat  x2   • ELBOW PROCEDURE      bilat    • JOINT REPLACEMENT Right 2010    HIP   • TONSILLECTOMY AND ADENOIDECTOMY      had to go in twice    • TOTAL HIP ARTHROPLASTY Left 12/19/2016    Procedure: TOTAL HIP ARTHROPLASTY ANTERIOR LEFT;  Surgeon: Bruce Berg MD;  Location: [facility];  Service:    • WISDOM TOOTH EXTRACTION      al 4 removed        Immunization History:  Influenza: no  Pneumococcal: no  Tetanus: yes    Social History:   Tobacco:   Social History     Tobacco Use   Smoking Status Never Smoker   Smokeless Tobacco Never Used      Alcohol:     Social History     Substance and Sexual Activity   Alcohol Use Yes   • Alcohol/week: 3.6 oz  "  • Types: 6 Cans of beer per week       Vitals:           /94 (BP Location: Right arm, Patient Position: Lying)   Pulse 92   Temp 97.8 °F (36.6 °C) (Temporal)   Resp 18   Ht 170.2 cm (67\")   Wt 109 kg (241 lb)   SpO2 96%   BMI 37.75 kg/m²      Physical Exam:  General Appearance:    Alert, cooperative, no distress, appears stated age   Head:    Normocephalic, without obvious abnormality, atraumatic   Lungs:     Clear to auscultation bilaterally, respirations unlabored    Heart:   Regular rate and rhythm, S1 and S2 normal, no murmur, rub    or gallop    Abdomen:    Soft, non-tender.  +bowel sounds   Breast Exam:    deferred   Genitalia:    deferred   Extremities:   Extremities normal, atraumatic, no cyanosis or edema   Skin:   Skin color, texture, turgor normal, no rashes or lesions   Neurologic:   Grossly intact   Results Review  LABS:  Lab Results   Component Value Date    WBC 9.95 06/14/2019    HGB 15.3 06/14/2019    HCT 46.6 06/14/2019    MCV 92.6 06/14/2019     06/14/2019    NEUTROABS 6.4 05/10/2019    GLUCOSE 89 12/22/2016    BUN 9 05/10/2019    CREATININE 0.9 05/10/2019    EGFRIFNONA 120 12/22/2016     05/10/2019    K 3.9 05/10/2019    CL 98 (L) 05/10/2019    CO2 22 05/10/2019    CALCIUM 9.4 05/10/2019    ALBUMIN 4.5 05/10/2019    AST 78 (H) 05/10/2019    ALT 52 05/10/2019    BILITOT 0.9 05/10/2019       RADIOLOGY:  Imaging Results (last 72 hours)     ** No results found for the last 72 hours. **          I reviewed the patient's new clinical results.    Cancer Staging (if applicable)  Cancer Patient: __ yes __no __unknown; If yes, clinical stage T:__ N:__M:__, stage group or __N/A    Impression: 53 year old male presenting with L4-5 spondylolithesis    Plan: L4-5 decompression and fusion with instrumentation    NESHA Byers   6/19/2019   6:57 AM    Electronically signed by Milan Valdez MD at 6/19/2019 10:53 AM       ICU Vital Signs     Row Name 06/19/19 1240 06/19/19 1220 " "06/19/19 1210 06/19/19 1200 06/19/19 1150       Vitals    Temp  98.1 °F (36.7 °C)  98.2 °F (36.8 °C)  --  --  --    Temp src  Axillary  --  --  --  --    Pulse  93  98  93  95  97    Heart Rate Source  Monitor  --  --  --  --    Resp  16  16  16  16  16    Resp Rate Source  Visual  --  --  --  --    BP  138/60  145/90  134/90  142/84  123/79    Noninvasive MAP (mmHg)  --  --  --  --  94    BP Location  Right arm  --  --  --  --    BP Method  Automatic  --  --  --  --    Patient Position  Lying  --  --  --  --       Oxygen Therapy    SpO2  93 %  94 %  97 %  95 %  95 %    Device (Oxygen Therapy)  room air  room air  room air  room air  room air    Row Name 06/19/19 1135 06/19/19 1130 06/19/19 1125 06/19/19 1121 06/19/19 1120       Vitals    Temp  --  --  --  97.6 °F (36.4 °C)  --    Temp src  --  --  --  Temporal  --    Pulse  93  98  101  105  105    Heart Rate Source  --  --  --  Monitor  --    Resp  16  16  16  14  16    BP  128/79  125/84  120/62  130/83  129/78    Noninvasive MAP (mmHg)  --  98  --  --  99    BP Location  --  --  --  Right arm  --       Oxygen Therapy    SpO2  97 %  97 %  97 %  96 %  94 %    Pulse Oximetry Type  --  --  --  Continuous  --    Device (Oxygen Therapy)  --  --  --  nasal cannula  --    Flow (L/min)  2  2  2  --  2    Row Name 06/19/19 1118 06/19/19 0633                Height and Weight    Height  --  170.2 cm (67\")       Height Method  --  Stated       Weight  --  109 kg (241 lb)       Weight Method  --  Stated       Ideal Body Weight (IBW) (kg)  --  68.1       BSA (Calculated - sq m)  --  2.19 sq meters       BMI (Calculated)  --  37.7       Weight in (lb) to have BMI = 25  --  159.3          Vitals    Temp  97.6 °F (36.4 °C)  97.8 °F (36.6 °C)       Temp src  Temporal  Temporal       Pulse  104  92       Heart Rate Source  Monitor  Monitor       Resp  16  18       Resp Rate Source  Visual  Visual       BP  130/83  143/94       BP Location  Right arm  Right arm       BP Method  " Automatic  Automatic       Patient Position  Lying  Lying          Oxygen Therapy    SpO2  98 %  96 %       Pulse Oximetry Type  Continuous  Continuous       Device (Oxygen Therapy)  nasal cannula  room air       Flow (L/min)  4  --           Hospital Medications (active)       Dose Frequency Start End    acetaminophen (TYLENOL) tablet 1,000 mg 1,000 mg Once 6/19/2019 6/19/2019    Sig - Route: Take 2 tablets by mouth 1 (One) Time. - Oral    acetaminophen (TYLENOL) tablet 650 mg 650 mg Every 4 Hours PRN 6/19/2019     Sig - Route: Take 2 tablets by mouth Every 4 (Four) Hours As Needed for Mild Pain . - Oral    allopurinol (ZYLOPRIM) tablet 300 mg 300 mg Daily 6/19/2019     Sig - Route: Take 1 tablet by mouth Daily. - Oral    atorvastatin (LIPITOR) tablet 10 mg 10 mg Nightly 6/19/2019     Sig - Route: Take 1 tablet by mouth Every Night. - Oral    bisacodyl (DULCOLAX) EC tablet 5 mg 5 mg Daily PRN 6/19/2019     Sig - Route: Take 1 tablet by mouth Daily As Needed for Constipation. - Oral    bisacodyl (DULCOLAX) suppository 10 mg 10 mg Daily PRN 6/19/2019     Sig - Route: Insert 1 suppository into the rectum Daily As Needed for Constipation. - Rectal    cetirizine (zyrTEC) tablet 10 mg 10 mg Daily 6/19/2019     Sig - Route: Take 1 tablet by mouth Daily. - Oral    colchicine tablet 0.6 mg 0.6 mg Daily 6/19/2019     Sig - Route: Take 1 tablet by mouth Daily. - Oral    cyclobenzaprine (FLEXERIL) tablet 10 mg 10 mg 3 Times Daily PRN 6/19/2019     Sig - Route: Take 1 tablet by mouth 3 (Three) Times a Day As Needed for Muscle Spasms. - Oral    diphenhydrAMINE (BENADRYL) capsule 50 mg 50 mg Every 6 Hours PRN 6/19/2019     Sig - Route: Take 1 capsule by mouth Every 6 (Six) Hours As Needed for Itching. - Oral    DULoxetine (CYMBALTA) DR capsule 60 mg 60 mg Daily 6/20/2019     Sig - Route: Take 1 capsule by mouth Daily. - Oral    famotidine (PEPCID) injection 20 mg 20 mg Every 12 Hours Scheduled 6/19/2019     Sig - Route: Infuse 2  "mL into a venous catheter Every 12 (Twelve) Hours. - Intravenous    Linked Group 1:  \"Or\" Linked Group Details        famotidine (PEPCID) tablet 20 mg 20 mg Every 12 Hours Scheduled 6/19/2019     Sig - Route: Take 1 tablet by mouth Every 12 (Twelve) Hours. - Oral    Linked Group 1:  \"Or\" Linked Group Details        fluticasone (FLONASE) 50 MCG/ACT nasal spray 2 spray 2 spray Daily PRN 6/19/2019     Sig - Route: 2 sprays into the nostril(s) as directed by provider Daily As Needed for Allergies. - Nasal    HYDROmorphone (DILAUDID) injection 1 mg 1 mg Every 4 Hours PRN 6/19/2019 6/29/2019    Sig - Route: Inject 1 mL into the appropriate muscle as directed by prescriber Every 4 (Four) Hours As Needed for Severe Pain . - Intramuscular    Linked Group 2:  \"And\" Linked Group Details        labetalol (NORMODYNE,TRANDATE) injection 10 mg 10 mg Every 4 Hours PRN 6/19/2019     Sig - Route: Infuse 2 mL into a venous catheter Every 4 (Four) Hours As Needed for High Blood Pressure (SBP>170). - Intravenous    levothyroxine (SYNTHROID, LEVOTHROID) tablet 50 mcg 50 mcg Every Early Morning 6/20/2019     Sig - Route: Take 1 tablet by mouth Every Morning. - Oral    lidocaine PF 1% (XYLOCAINE) injection 0.5 mL 0.5 mL Once As Needed 6/19/2019 6/19/2019    Sig - Route: Inject 0.5 mL as directed 1 (One) Time As Needed (IV Start). - Injection    lisinopril (PRINIVIL,ZESTRIL) tablet 10 mg 10 mg Daily 6/19/2019     Sig - Route: Take 1 tablet by mouth Daily. - Oral    metoprolol tartrate (LOPRESSOR) tablet 100 mg 100 mg Every 12 Hours Scheduled 6/19/2019     Sig - Route: Take 1 tablet by mouth Every 12 (Twelve) Hours. - Oral    morphine injection 1 mg 1 mg Every 4 Hours PRN 6/19/2019 6/29/2019    Sig - Route: Infuse 0.5 mL into a venous catheter Every 4 (Four) Hours As Needed for Moderate Pain . - Intravenous    Linked Group 3:  \"And\" Linked Group Details        mupirocin (BACTROBAN) 2 % nasal ointment  Once 6/19/2019 6/19/2019    Sig - Route: " "by Each Nare route 1 (One) Time. - Each Nare    naloxone (NARCAN) injection 0.4 mg 0.4 mg Every 5 Minutes PRN 6/19/2019     Sig - Route: Infuse 1 mL into a venous catheter Every 5 (Five) Minutes As Needed for Respiratory Depression. - Intravenous    Linked Group 3:  \"And\" Linked Group Details        naloxone (NARCAN) injection 0.4 mg 0.4 mg Every 5 Minutes PRN 6/19/2019     Sig - Route: Infuse 1 mL into a venous catheter Every 5 (Five) Minutes As Needed for Respiratory Depression. - Intravenous    Linked Group 2:  \"And\" Linked Group Details        ondansetron (ZOFRAN) injection 4 mg 4 mg Every 6 Hours PRN 6/19/2019     Sig - Route: Infuse 2 mL into a venous catheter Every 6 (Six) Hours As Needed for Nausea or Vomiting. - Intravenous    oxyCODONE (oxyCONTIN) 12 hr tablet 10 mg 10 mg Once 6/19/2019 6/19/2019    Sig - Route: Take 1 tablet by mouth 1 (One) Time. - Oral    oxyCODONE-acetaminophen (PERCOCET)  MG per tablet 1 tablet 1 tablet Every 4 Hours PRN 6/19/2019 6/29/2019    Sig - Route: Take 1 tablet by mouth Every 4 (Four) Hours As Needed for Severe Pain . - Oral    pregabalin (LYRICA) capsule 75 mg 75 mg Once 6/19/2019 6/19/2019    Sig - Route: Take 1 capsule by mouth 1 (One) Time. - Oral    sodium chloride 0.9 % bolus 500 mL 500 mL 3 Times Daily PRN 6/19/2019     Sig - Route: Infuse 500 mL into a venous catheter 3 (Three) Times a Day As Needed (for SBP less than 90). - Intravenous    sodium chloride 0.9 % flush 3 mL 3 mL Every 12 Hours Scheduled 6/19/2019     Sig - Route: Infuse 3 mL into a venous catheter Every 12 (Twelve) Hours. - Intravenous    sodium chloride 0.9 % flush 3-10 mL 3-10 mL As Needed 6/19/2019     Sig - Route: Infuse 3-10 mL into a venous catheter As Needed for Line Care. - Intravenous    sodium chloride 0.9 % with KCl 20 mEq/L infusion 100 mL/hr Continuous 6/19/2019     Sig - Route: Infuse 100 mL/hr into a venous catheter Continuous. - Intravenous    vancomycin 1750 mg/500 mL 0.9% NS IVPB " ([facility]) 1,750 mg Once 6/19/2019 6/19/2019    Sig - Route: Infuse 500 mL into a venous catheter 1 (One) Time. - Intravenous    vancomycin 1750 mg/500 mL 0.9% NS IVPB ([facility]) 15 mg/kg × 109 kg Every 12 Hours 6/19/2019 6/20/2019    Sig - Route: Infuse 500 mL into a venous catheter Every 12 (Twelve) Hours. - Intravenous    zolpidem (AMBIEN) tablet 5 mg 5 mg Nightly PRN 6/19/2019 6/29/2019    Sig - Route: Take 1 tablet by mouth At Night As Needed for Sleep. - Oral    albumin human 5 % solution (Discontinued)  Continuous PRN 6/19/2019 6/19/2019    Sig: Continuous As Needed.    Reason for Discontinue: Anesthesia Stop    bupivacaine-EPINEPHrine PF (MARCAINE w/EPI) 0.25% -1:[id number] injection (Discontinued)  As Needed 6/19/2019 6/19/2019    Sig: As Needed.    Reason for Discontinue: Patient Discharge    dexamethasone (DECADRON) injection (Discontinued)  As Needed 6/19/2019 6/19/2019    Sig - Route: Infuse  into a venous catheter As Needed. - Intravenous    Reason for Discontinue: Anesthesia Stop    esmolol (BREVIBLOC) injection (Discontinued)  As Needed 6/19/2019 6/19/2019    Sig: As Needed.    Reason for Discontinue: Anesthesia Stop    famotidine (PEPCID) tablet 20 mg (Discontinued) 20 mg 60 Minutes Pre-Op 6/19/2019 6/19/2019    Sig - Route: Take 1 tablet by mouth 60 Minutes Prior to Surgery. - Oral    Reason for Discontinue: Patient Transfer    fentaNYL citrate (PF) (SUBLIMAZE) injection 50 mcg (Discontinued) 50 mcg Every 5 Minutes PRN 6/19/2019 6/19/2019    Sig - Route: Infuse 1 mL into a venous catheter Every 5 (Five) Minutes As Needed for Moderate Pain . - Intravenous    Reason for Discontinue: Patient Transfer    gelatin absorbable (GELFOAM) sponge (Discontinued)  As Needed 6/19/2019 6/19/2019    Sig: As Needed.    Reason for Discontinue: Patient Discharge    glycopyrrolate (ROBINUL) injection (Discontinued)  As Needed 6/19/2019 6/19/2019    Sig - Route: Infuse  into a venous catheter As Needed. - Intravenous    Reason for  Discontinue: Anesthesia Stop    HYDROmorphone (DILAUDID) injection 0.5 mg (Discontinued) 0.5 mg Every 10 Minutes PRN 6/19/2019 6/19/2019    Sig - Route: Infuse 0.5 mL into a venous catheter Every 10 (Ten) Minutes As Needed for Severe Pain . - Intravenous    Reason for Discontinue: Patient Transfer    lactated ringers infusion (Discontinued) 9 mL/hr Continuous PRN 6/19/2019 6/19/2019    Sig - Route: Infuse 9 mL/hr into a venous catheter Continuous As Needed (Start Prior to Surgery). - Intravenous    Reason for Discontinue: Patient Transfer    lidocaine (XYLOCAINE) 1 % injection (Discontinued)  As Needed 6/19/2019 6/19/2019    Sig: As Needed.    Reason for Discontinue: Anesthesia Stop    Neostigmine Methylsulfate solution prefilled syringe (Discontinued)  As Needed 6/19/2019 6/19/2019    Sig - Route: Infuse  into a venous catheter As Needed. - Intravenous    Reason for Discontinue: Anesthesia Stop    ondansetron (ZOFRAN) injection 4 mg (Discontinued) 4 mg Every 6 Hours PRN 6/19/2019 6/19/2019    Sig - Route: Infuse 2 mL into a venous catheter Every 6 (Six) Hours As Needed for Nausea or Vomiting. - Intravenous    Reason for Discontinue: Duplicate order    ondansetron (ZOFRAN) injection (Discontinued)  As Needed 6/19/2019 6/19/2019    Sig - Route: Infuse  into a venous catheter As Needed. - Intravenous    Reason for Discontinue: Anesthesia Stop    phenylephrine (LEN-SYNEPHRINE) 10 mg in sodium chloride 0.9 % 100 mL infusion (Discontinued)  Continuous PRN 6/19/2019 6/19/2019    Sig: Continuous As Needed.    Reason for Discontinue: Anesthesia Stop    phenylephrine (LEN-SYNEPHRINE) injection (Discontinued)  As Needed 6/19/2019 6/19/2019    Sig - Route: Infuse  into a venous catheter As Needed. - Intravenous    Reason for Discontinue: Anesthesia Stop    promethazine (PHENERGAN) injection 6.25 mg (Discontinued) 6.25 mg Once As Needed 6/19/2019 6/19/2019    Sig - Route: Infuse 0.25 mL into a venous catheter 1 (One) Time As  "Needed for Nausea or Vomiting. - Intravenous    Reason for Discontinue: Patient Transfer    Linked Group 4:  \"Or\" Linked Group Details        promethazine (PHENERGAN) injection 6.25 mg (Discontinued) 6.25 mg Once As Needed 6/19/2019 6/19/2019    Sig - Route: Inject 0.25 mL into the appropriate muscle as directed by prescriber 1 (One) Time As Needed for Nausea or Vomiting. - Intramuscular    Reason for Discontinue: Patient Transfer    Linked Group 4:  \"Or\" Linked Group Details        promethazine (PHENERGAN) suppository 25 mg (Discontinued) 25 mg Once As Needed 6/19/2019 6/19/2019    Sig - Route: Insert 1 suppository into the rectum 1 (One) Time As Needed for Nausea or Vomiting. - Rectal    Reason for Discontinue: Patient Transfer    Linked Group 4:  \"Or\" Linked Group Details        promethazine (PHENERGAN) tablet 25 mg (Discontinued) 25 mg Once As Needed 6/19/2019 6/19/2019    Sig - Route: Take 1 tablet by mouth 1 (One) Time As Needed for Nausea or Vomiting. - Oral    Reason for Discontinue: Patient Transfer    Linked Group 4:  \"Or\" Linked Group Details        Propofol (DIPRIVAN) injection (Discontinued)  As Needed 6/19/2019 6/19/2019    Sig - Route: Infuse  into a venous catheter As Needed. - Intravenous    Reason for Discontinue: Anesthesia Stop    sodium chloride 0.9 % flush 1-10 mL (Discontinued) 1-10 mL As Needed 6/19/2019 6/19/2019    Sig - Route: Infuse 1-10 mL into a venous catheter As Needed for Line Care. - Intravenous    Reason for Discontinue: Patient Transfer    sodium chloride 0.9 % flush 3 mL (Discontinued) 3 mL Every 12 Hours Scheduled 6/19/2019 6/19/2019    Sig - Route: Infuse 3 mL into a venous catheter Every 12 (Twelve) Hours. - Intravenous    Reason for Discontinue: Patient Transfer    sodium chloride 1,000 mL with bacitracin 50,000 Units, polymyxin B 500,000 Units irrigation (Discontinued)  As Needed 6/19/2019 6/19/2019    Sig: As Needed.    Reason for Discontinue: Patient Discharge    " succinylcholine (ANECTINE) injection (Discontinued)  As Needed 6/19/2019 6/19/2019    Sig: As Needed.    Reason for Discontinue: Anesthesia Stop    SUFentanil (SUFENTA) injection (Discontinued)  As Needed 6/19/2019 6/19/2019    Sig: As Needed.    Reason for Discontinue: Anesthesia Stop    thrombin topical (Discontinued)  As Needed 6/19/2019 6/19/2019    Sig: As Needed.    Reason for Discontinue: Patient Discharge    vecuronium (NORCURON) injection (Discontinued)  As Needed 6/19/2019 6/19/2019    Sig: As Needed.    Reason for Discontinue: Anesthesia Stop            Lab Results (last 24 hours)     Procedure Component Value Units Date/Time    Potassium [[id number]]  (Normal) Collected:  06/19/19 0645    Specimen:  Blood Updated:  06/19/19 0720     Potassium 4.0 mmol/L         Imaging Results (last 24 hours)     Procedure Component Value Units Date/Time    FL C Arm During Surgery [[id number]] Collected:  06/19/19 1059     Updated:  06/19/19 1142    Narrative:       EXAMINATION: FLUOROSCOPY C-ARM DURING SURGERY-     INDICATION: L4-L5 decompression and fusion with instrumentation.      TECHNIQUE: Intraoperative fluoroscopy for improved localization and  treatment planning.     COMPARISON: None.     FINDINGS: Intraoperative fluoroscopy with total fluoroscopic time usage  59 seconds and three representative images saved during L4-L5 fusion.       Impression:       Intraoperative fluoroscopy utilized during L4-L5 PLIF.      D:  06/19/2019  E:  06/19/2019           Orders (last 24 hrs)     Start     Ordered    06/20/19 0900  DULoxetine (CYMBALTA) DR capsule 60 mg  Daily      06/19/19 1200    06/20/19 0600  Hemoglobin & Hematocrit, Blood  Morning Draw     Comments:  x3      06/19/19 1232    06/20/19 0600  levothyroxine (SYNTHROID, LEVOTHROID) tablet 50 mcg  Every Early Morning      06/19/19 1200    06/20/19 0600  CBC (No Diff)  Morning Draw      06/19/19 1233    06/20/19 0600  Basic Metabolic Panel  Morning Draw      06/19/19  1233    06/19/19 2100  atorvastatin (LIPITOR) tablet 10 mg  Nightly      06/19/19 1200    06/19/19 2000  vancomycin 1750 mg/500 mL 0.9% NS IVPB ([facility])  Every 12 Hours      06/19/19 1232    06/19/19 1600  Vital Signs  Every 4 Hours      06/19/19 1232    06/19/19 1600  Neurovascular Checks  Every 4 Hours      06/19/19 1232    06/19/19 1600  Strict Intake and Output  Every 8 Hours      06/19/19 1232    06/19/19 1400  Incentive Spirometry  Every 2 Hours While Awake      06/19/19 1232    06/19/19 1400  allopurinol (ZYLOPRIM) tablet 300 mg  Daily      06/19/19 1200    06/19/19 1400  colchicine tablet 0.6 mg  Daily      06/19/19 1200    06/19/19 1400  lisinopril (PRINIVIL,ZESTRIL) tablet 10 mg  Daily      06/19/19 1200    06/19/19 1400  cetirizine (zyrTEC) tablet 10 mg  Daily      06/19/19 1200    06/19/19 1400  metoprolol tartrate (LOPRESSOR) tablet 100 mg  Every 12 Hours Scheduled      06/19/19 1200    06/19/19 1330  sodium chloride 0.9 % flush 3 mL  Every 12 Hours Scheduled      06/19/19 1232    06/19/19 1330  sodium chloride 0.9 % with KCl 20 mEq/L infusion  Continuous      06/19/19 1232    06/19/19 1330  famotidine (PEPCID) injection 20 mg  Every 12 Hours Scheduled      06/19/19 1232    06/19/19 1330  famotidine (PEPCID) tablet 20 mg  Every 12 Hours Scheduled      06/19/19 1232    06/19/19 1233  Ambulate Patient  Every Shift      06/19/19 1232    06/19/19 1233  Empty drain  Every Shift     Comments:  Remove drain on POD #2.    06/19/19 1232    06/19/19 1233  Notify Physician (Standard Adult Parameters)  Until Discontinued      06/19/19 1232    06/19/19 1233  Oxygen Therapy- Nasal Cannula; Titrate for SPO2: equal to or greater than, 92%, per policy  Continuous      06/19/19 1232    06/19/19 1233  Continuous Pulse Oximetry  Continuous      06/19/19 1232    06/19/19 1233  Diet Regular; Consistent Carbohydrate, Cardiac  Diet Effective Now      06/19/19 1232    06/19/19 1233  Advance diet as tolerated  Until Discontinued       06/19/19 1232    06/19/19 1233  Insert Peripheral IV  Once      06/19/19 1232    06/19/19 1233  Saline Lock & Maintain IV Access  Continuous      06/19/19 1232    06/19/19 1233  Code Status and Medical Interventions:  Continuous      06/19/19 1232    06/19/19 1233  ondansetron (ZOFRAN) injection 4 mg  Every 6 Hours PRN,   Status:  Discontinued      06/19/19 1233    06/19/19 1233  sodium chloride 0.9 % bolus 500 mL  3 Times Daily PRN      06/19/19 1233    06/19/19 1233  labetalol (NORMODYNE,TRANDATE) injection 10 mg  Every 4 Hours PRN      06/19/19 1233    06/19/19 1232  morphine injection 1 mg  Every 4 Hours PRN      06/19/19 1232    06/19/19 1232  naloxone (NARCAN) injection 0.4 mg  Every 5 Minutes PRN      06/19/19 1232    06/19/19 1232  HYDROmorphone (DILAUDID) injection 1 mg  Every 4 Hours PRN      06/19/19 1232    06/19/19 1232  naloxone (NARCAN) injection 0.4 mg  Every 5 Minutes PRN      06/19/19 1232    06/19/19 1232  sodium chloride 0.9 % flush 3-10 mL  As Needed      06/19/19 1232    06/19/19 1232  acetaminophen (TYLENOL) tablet 650 mg  Every 4 Hours PRN      06/19/19 1232    06/19/19 1232  oxyCODONE-acetaminophen (PERCOCET)  MG per tablet 1 tablet  Every 4 Hours PRN      06/19/19 1232    06/19/19 1232  zolpidem (AMBIEN) tablet 5 mg  Nightly PRN      06/19/19 1232    06/19/19 1232  bisacodyl (DULCOLAX) EC tablet 5 mg  Daily PRN      06/19/19 1232    06/19/19 1232  bisacodyl (DULCOLAX) suppository 10 mg  Daily PRN      06/19/19 1232    06/19/19 1232  ondansetron (ZOFRAN) injection 4 mg  Every 6 Hours PRN      06/19/19 1232    06/19/19 1232  fluticasone (FLONASE) 50 MCG/ACT nasal spray 2 spray  Daily PRN      06/19/19 1200    06/19/19 1158  diphenhydrAMINE (BENADRYL) capsule 50 mg  Every 6 Hours PRN      06/19/19 1200    06/19/19 1158  cyclobenzaprine (FLEXERIL) tablet 10 mg  3 Times Daily PRN      06/19/19 1200    06/19/19 1157  Inpatient Consult to Hospitalist  Once     Comments:  Please contact  from the PACU.   Specialty:  Hospitalist  Provider:  Lily Espinoza MD    06/19/19 1156    06/19/19 1157  PT Consult: Eval & Treat as tolerated; Ambulation  Once     Comments:  Walk today if possible.    06/19/19 1156    06/19/19 1157  OT Consult: Eval & Treat ADLs  Once      06/19/19 1156    06/19/19 1157  Place Sequential Compression Device  Once      06/19/19 1156    06/19/19 1157  Maintain Sequential Compression Device  Continuous      06/19/19 1156    06/19/19 1157  Consult to Case Management   Once     Provider:  (Not yet assigned)    06/19/19 1156    06/19/19 1051  Inpatient Admission  Once      06/19/19 1053    06/19/19 1048  Vital signs every 5 minutes for 15 minutes, every 15 minutes thereafter.  Once,   Status:  Canceled      06/19/19 1047    06/19/19 1048  Notify Anesthesia of Any Acute Changes in Patient Condition  Until Discontinued,   Status:  Canceled      06/19/19 1047    06/19/19 1048  Notify Anesthesia for Unrelieved Pain  Until Discontinued,   Status:  Canceled      06/19/19 1047    06/19/19 1048  Pulse Oximetry, Continuous  Continuous,   Status:  Canceled      06/19/19 1047    06/19/19 1048  Once DC criteria to floor met, follow surgeon's orders.  Until Discontinued,   Status:  Canceled      06/19/19 1047    06/19/19 1048  Discharge patient from PACU when discharge criteria is met.  Until Discontinued,   Status:  Canceled      06/19/19 1047    06/19/19 1047  promethazine (PHENERGAN) injection 6.25 mg  Once As Needed,   Status:  Discontinued      06/19/19 1047    06/19/19 1047  promethazine (PHENERGAN) injection 6.25 mg  Once As Needed,   Status:  Discontinued      06/19/19 1047    06/19/19 1047  promethazine (PHENERGAN) suppository 25 mg  Once As Needed,   Status:  Discontinued      06/19/19 1047    06/19/19 1047  promethazine (PHENERGAN) tablet 25 mg  Once As Needed,   Status:  Discontinued      06/19/19 1047    06/19/19 1047  HYDROmorphone (DILAUDID) injection 0.5 mg   Every 10 Minutes PRN,   Status:  Discontinued      06/19/19 1047    06/19/19 1047  fentaNYL citrate (PF) (SUBLIMAZE) injection 50 mcg  Every 5 Minutes PRN,   Status:  Discontinued      06/19/19 1047    06/19/19 0900  sodium chloride 0.9 % flush 3 mL  Every 12 Hours Scheduled,   Status:  Discontinued      06/19/19 0624    06/19/19 0836  bupivacaine-EPINEPHrine PF (MARCAINE w/EPI) 0.25% -1:[id number] injection  As Needed,   Status:  Discontinued      06/19/19 0838    06/19/19 0835  gelatin absorbable (GELFOAM) sponge  As Needed,   Status:  Discontinued      06/19/19 0835    06/19/19 0835  thrombin topical  As Needed,   Status:  Discontinued      06/19/19 0836    06/19/19 0829  sodium chloride 1,000 mL with bacitracin 50,000 Units, polymyxin B 500,000 Units irrigation  As Needed,   Status:  Discontinued      06/19/19 0830    06/19/19 0657  Type & Screen  STAT      06/19/19 0656    06/19/19 0639  vancomycin 1750 mg/500 mL 0.9% NS IVPB ([facility])  Once      06/19/19 0637    06/19/19 0629  FL C Arm During Surgery  1 Time Imaging      06/19/19 0629    06/19/19 0626  acetaminophen (TYLENOL) tablet 1,000 mg  Once      06/19/19 0624    06/19/19 0626  oxyCODONE (oxyCONTIN) 12 hr tablet 10 mg  Once      06/19/19 0624    06/19/19 0626  pregabalin (LYRICA) capsule 75 mg  Once      06/19/19 0624    06/19/19 0626  mupirocin (BACTROBAN) 2 % nasal ointment  Once      06/19/19 0624    06/19/19 0626  Potassium  STAT      06/19/19 0625    06/19/19 0625  Oxygen Therapy- Nasal Cannula; Titrate for SPO2: equal to or greater than, 90%  Continuous,   Status:  Canceled      06/19/19 0624    06/19/19 0625  Pulse Oximetry, Continuous  Continuous,   Status:  Canceled      06/19/19 0624    06/19/19 0625  Notify Anesthesiologist - Acute Changes in Patient Condition  Continuous,   Status:  Canceled      06/19/19 0624 06/19/19 0625  Notify Anesthesiologist - Unrelieved Pain  Continuous,   Status:  Canceled      06/19/19 0624 06/19/19 0625  Insert  Peripheral IV  Once,   Status:  Canceled      06/19/19 0624    06/19/19 0625  Saline Lock & Maintain IV Access  Continuous,   Status:  Canceled      06/19/19 0624    06/19/19 0625  Place Sequential Compression Device  Once,   Status:  Canceled      06/19/19 0624    06/19/19 0625  Follow Anesthesia Guidelines / Standing Orders  Once,   Status:  Canceled      06/19/19 0624    06/19/19 0625  Verify / Perform Chlorhexidine Skin Prep if Indicated (If Not Already Completed)  Once,   Status:  Canceled      06/19/19 0624    06/19/19 0624  Vital Signs - Per Anesthesia Protocol  As Needed,   Status:  Canceled      06/19/19 0624    06/19/19 0624  sodium chloride 0.9 % flush 1-10 mL  As Needed,   Status:  Discontinued      06/19/19 0624    06/19/19 0624  lidocaine PF 1% (XYLOCAINE) injection 0.5 mL  Once As Needed      06/19/19 0624    06/19/19 0624  lactated ringers infusion  Continuous PRN,   Status:  Discontinued      06/19/19 0624    06/19/19 0624  famotidine (PEPCID) tablet 20 mg  60 Minutes Pre-Op,   Status:  Discontinued      06/19/19 0624    Unscheduled  Up in Chair  As Needed      06/19/19 1232             Operative/Procedure Notes (last 24 hours) (Notes from 6/18/2019  1:30 PM through 6/19/2019  1:30 PM)      Marge Valdez MD at 6/19/2019 10:45 AM         Dictation on: 06/19/2019 10:50 AM by: MARGE VALDEZ [[id number]]         Electronically signed by Marge Valdez MD at 6/19/2019 10:50 AM

## 2019-06-19 NOTE — PROGRESS NOTES
Continued Stay Note  [facility]     Patient Name: Mckay Hunt  MRN: [id number]  Today's Date: 6/19/2019    Admit Date: 6/19/2019    Discharge Plan     Row Name 06/19/19 9114       Plan    Plan  [facility] transportation    Plan Comments  SW called [facility]'s main hub/office out of [place], [place] ([phone]) and was told that they provide contract drivers for [employer]icaid transportation out of [facility].  MILLY called [facility] at [phone].  MILLY confirmed that pt. is active with [facility] transportation agency.  If pt. is discharged on Saturday, [facility] requests a discharge early in the day (pt would need to have discharge orders and be prepared for  by 11:00am).  [facility] does NOT do transportation on Sunday.  SW will continue to follow for transportation needs.      Row Name 06/19/19 5341       Plan    Plan  Home +/- HH    Patient/Family in Agreement with Plan  yes    Plan Comments  Spoke with patient and dtr at bedside. He lives alone in a one story in [facility]. His dtr lives close. PTA he was independent with ADL's and used a cane or rollator for assist w/ mobility. Therapy to eval. He is interested in HH/PT/SN, but does not want a RW. He states he prefers his rollator. CM will make referrals pending therapy recs. He also stated that he uses [employer] for transportation. VM left w/ SW to assist with arranging. No other needs noted at this time. CM following.         Discharge Codes    No documentation.             RAVINDRA Ramos

## 2019-06-19 NOTE — ANESTHESIA POSTPROCEDURE EVALUATION
Patient: Mckay Hunt    Procedure Summary     Date:  06/19/19 Room / Location:   [facility]  [facility]    Anesthesia Start:  0731 Anesthesia Stop:      Procedure:  L4-L5 DECOMPRESSION AND FUSION WITH INSTRUMENTATION (N/A Spine Lumbar) Diagnosis:      Surgeon:  Milan Valdez MD Provider:  Jeff Burnham MD    Anesthesia Type:  general ASA Status:  3          Anesthesia Type: general  Last vitals  BP   130/83 (06/19/19 1121)   Temp   97.6 °F (36.4 °C) (06/19/19 1121)   Pulse   105 (06/19/19 1121)   Resp   14 (06/19/19 1121)     SpO2   96 % (06/19/19 1121)     Post Anesthesia Care and Evaluation    Patient location during evaluation: PACU  Patient participation: complete - patient participated  Level of consciousness: awake and responsive to verbal stimuli  Pain score: 0  Pain management: adequate  Airway patency: patent  Anesthetic complications: No anesthetic complications  PONV Status: none  Cardiovascular status: stable  Respiratory status: acceptable, nasal cannula and spontaneous ventilation  Hydration status: stable    Comments: Pt transferred to PACU with O2. Vital signs stable. Report to PACU RN and care accepted. Bacilio and follows commands.

## 2019-06-19 NOTE — PROGRESS NOTES
"Ortho Spine Progress Note     LOS: 0 days   Patient Care Team:  Malick Cruz DO as PCP - General (Family Medicine)    Subjective: Patient in room.  Sitting up in chair.  He has ambulated already.  He says his back is sore.  No leg pain.  No unusual complaints.    Objective:   Vital Signs:  /68 (BP Location: Right arm, Patient Position: Sitting)   Pulse 96   Temp 98.4 °F (36.9 °C) (Oral)   Resp 18   Ht 170.2 cm (67\")   Wt 109 kg (241 lb)   SpO2 95%   BMI 37.75 kg/m²     Labs:  Lab Results (last 24 hours)     Procedure Component Value Units Date/Time    Potassium [[id number]]  (Normal) Collected:  06/19/19 0645    Specimen:  Blood Updated:  06/19/19 0720     Potassium 4.0 mmol/L           Awake, alert, oriented.  Intact antigravity strength in lower extremities.    Assessment/Plan: Appears stable night of surgery.  I will be out of town tomorrow and Dr. Fink will follow in my absence.  I will see him back in office 2.5-3.0 weeks postop for staple removal.  I have left a prescription for Percocet 10.0, #50, for home use.    Milan Valdez MD  06/19/19  6:28 PM      "

## 2019-06-19 NOTE — PLAN OF CARE
Problem: Patient Care Overview  Goal: Plan of Care Review  Outcome: Ongoing (interventions implemented as appropriate)   06/19/19 9814   OTHER   Outcome Summary Pt ambulated 90 feet with CGA and RW, limited by pain. PT will follow to increase strength and progress functional mobility with back precautions. Plan is home with HHPT at discharge   Coping/Psychosocial   Plan of Care Reviewed With patient

## 2019-06-19 NOTE — THERAPY EVALUATION
Acute Care - Physical Therapy Initial Evaluation  [facility]     Patient Name: Mckay Hunt  : 1965  MRN: [id number]  Today's Date: 2019   Onset of Illness/Injury or Date of Surgery: 19  Date of Referral to PT: 19  Referring Physician: MD José Miguel      Admit Date: 2019    Visit Dx:     ICD-10-CM ICD-9-CM   1. Impaired functional mobility, balance, gait, and endurance Z74.09 V49.89     Patient Active Problem List   Diagnosis   • Arthritis of left hip   • Hypothyroid   • HTN (hypertension)   • Status post total replacement of left hip   • Leukocytosis, mild, likely reactive   • Acute blood loss anemia, mild, asymptomatic   • Back pain   • S/P lumbar fusion   • HLD (hyperlipidemia)     Past Medical History:   Diagnosis Date   • Abnormal gait    • Anesthesia complication     perks pt up instead of downer,     trouble to put under   • Arthritis    • Cellulitis    • Disease of thyroid gland     hypothyroidism   • Gout    • Hip pain     left   • Hypertension    • Muscle weakness    • Spondylolisthesis    • Tinnitus    • Wears glasses     readers     Past Surgical History:   Procedure Laterality Date   • CARPAL TUNNEL RELEASE      bilat  x2   • ELBOW PROCEDURE      bilat    • JOINT REPLACEMENT Right     HIP   • TONSILLECTOMY AND ADENOIDECTOMY      had to go in twice    • TOTAL HIP ARTHROPLASTY Left 2016    Procedure: TOTAL HIP ARTHROPLASTY ANTERIOR LEFT;  Surgeon: Bruce Berg MD;  Location: [facility];  Service:    • WISDOM TOOTH EXTRACTION      al 4 removed         PT ASSESSMENT (last 12 hours)      Physical Therapy Evaluation     Row Name 19 1737          PT Evaluation Time/Intention    Subjective Information  complains of;pain  -MJ     Document Type  evaluation  -MJ     Mode of Treatment  physical therapy  -MJ     Patient Effort  good  -MJ     Symptoms Noted During/After Treatment  increased pain  -MJ     Comment  RN notified and pain meds given  -MJ     Row Name  06/19/19 1737          General Information    Patient Profile Reviewed?  yes  -MJ     Onset of Illness/Injury or Date of Surgery  06/19/19  -     Referring Physician  MD José Miguel  -     Patient Observations  alert;cooperative;agree to therapy  -MJ     General Observations of Patient  Pt supine in bed, hemovac, IV, SCDs.   -MJ     Prior Level of Function  min assist:;all household mobility;community mobility;gait;transfer;bed mobility;cooking;cleaning;sh[employer] with activity, used rollator or cane for mobility  -MJ     Equipment Currently Used at Home  rollator;cane, straight  -MJ     Pertinent History of Current Functional Problem  Pt presents for surgical management of back pain and dysfunction with symptoms into B LEs that failed to improve with conservative management  -MJ     Existing Precautions/Restrictions  fall;spinal;other (see comments) hemovac  -MJ     Risks Reviewed  patient:;LOB;increased discomfort  -MJ     Benefits Reviewed  patient:;improve function;increase independence;increase strength;increase balance;decrease pain  -MJ     Barriers to Rehab  previous functional deficit  -MJ     Row Name 06/19/19 1737          Relationship/Environment    Lives With  alone  -MJ     Family Caregiver if Needed  child(herberth), adult  -MJ     Concerns About Impact on Relationships  Dtr lives close by, but works. No 24/7 assist  -MJ     Row Name 06/19/19 1737          Resource/Environmental Concerns    Current Living Arrangements  home/apartment/condo  -MJ     Row Name 06/19/19 1737          Home Main Entrance    Number of Stairs, Main Entrance  two  -MJ     Stair Railings, Main Entrance  railing on right side (ascending)  -MJ     Row Name 06/19/19 1737          Cognitive Assessment/Intervention- PT/OT    Orientation Status (Cognition)  oriented x 4  -MJ     Follows Commands (Cognition)  WFL  -MJ     Row Name 06/19/19 1737          Safety Issues, Functional Mobility    Impairments Affecting Function  (Mobility)  pain;strength  -MJ     Row Name 06/19/19 1737          Bed Mobility Assessment/Treatment    Bed Mobility Assessment/Treatment  supine-sit  -MJ     Supine-Sit [place] (Bed Mobility)  supervision;verbal cues  -MJ     Assistive Device (Bed Mobility)  bed rails;head of bed elevated  -     Comment (Bed Mobility)  Pt performed log roll out of bed, verbal cues for sequencing. Increased time and effort to perform  -     Row Name 06/19/19 1737          Transfer Assessment/Treatment    Transfer Assessment/Treatment  sit-stand transfer;stand-sit transfer;toilet transfer  -     Comment (Transfers)  Verbal cues to push up from bed with UEs to stand and to reach back for chair to lower into sitting. Assisted pt to bathroom, cues to drive RW over toilet to stand and void  -MJ     Sit-Stand [place] (Transfers)  contact guard;verbal cues  -MJ     Stand-Sit [place] (Transfers)  contact guard;verbal cues  -MJ     [place] Level (Toilet Transfer)  contact guard;verbal cues  -MJ     Assistive Device (Toilet Transfer)  walker, front-wheeled  -MJ     Row Name 06/19/19 1737          Sit-Stand Transfer    Assistive Device (Sit-Stand Transfers)  walker, front-wheeled  -MJ     Row Name 06/19/19 1737          Stand-Sit Transfer    Assistive Device (Stand-Sit Transfers)  walker, front-wheeled  -MJ     Row Name 06/19/19 1737          Toilet Transfer    Type (Toilet Transfer)  stand pivot/stand step  -     Row Name 06/19/19 1737          Gait/Stairs Assessment/Training    32831 - Gait Training Minutes   8  -MJ     [place] Level (Gait)  contact guard;verbal cues  -     Assistive Device (Gait)  walker, front-wheeled  -     Distance in Feet (Gait)  90  -MJ     Pattern (Gait)  step-through  -MJ     Deviations/Abnormal Patterns (Gait)  bilateral deviations;keyana decreased;stride length decreased  -MJ     Bilateral Gait Deviations  forward flexed posture;heel strike decreased;weight shift ability  decreased  -MJ     Comment (Gait/Stairs)  Pt demo step through gait pattern at slow pace. Verbal cues for upright posture, to stay in middle of RW and to push RW along continuously. Gait limited by pain  -MJ     Row Name 06/19/19 1737          General ROM    GENERAL ROM COMMENTS  No ROM deficits B LE  -MJ     Row Name 06/19/19 1737          MMT (Manual Muscle Testing)    General MMT Comments  B LE grossly 4/5  -MJ     Row Name 06/19/19 1737          Motor Assessment/Intervention    Additional Documentation  Therapeutic Exercise (Group);Therapeutic Exercise Interventions (Group)  -MJ     Row Name 06/19/19 1737          Therapeutic Exercise    94922 - PT Therapeutic Activity Minutes  2  -MJ     Row Name 06/19/19 1737          Sensory Assessment/Intervention    Sensory General Assessment  no sensation deficits identified denies numbness/tingling; can actively DF both ankles  -MJ     Row Name 06/19/19 1737          Pain Assessment    Additional Documentation  Pain Scale: Numbers Pre/Post-Treatment (Group)  -MJ     Row Name 06/19/19 1737          Pain Scale: Numbers Pre/Post-Treatment    Pain Scale: Numbers, Pretreatment  7/10  -MJ     Pain Scale: Numbers, Post-Treatment  8/10  -MJ     Pain Location - Orientation  lower  -MJ     Pain Location  back  -MJ     Pain Intervention(s)  Repositioned;Ambulation/increased activity;Medication (See MAR)  -MJ     Row Name             Wound 06/19/19 1019 Other (See comments) back incision    Wound - Properties Group Date first assessed: 06/19/19  -KS Time first assessed: 1019  -KS Side: Other (See comments)  -KS Location: back  -KS Type: incision  -KS    Row Name 06/19/19 1737          Plan of Care Review    Plan of Care Reviewed With  patient  -MJ     Row Name 06/19/19 1737          Physical Therapy Clinical Impression    Date of Referral to PT  06/19/19  -MJ     PT Diagnosis (PT Clinical Impression)  s/p L4-5 TLIF  -MJ     Patient/Family Goals Statement (PT Clinical Impression)  to  decrease pain  -MJ     Criteria for Skilled Interventions Met (PT Clinical Impression)  yes;treatment indicated  -     Care Plan Review (PT)  evaluation/treatment results reviewed;care plan/treatment goals reviewed;risks/benefits reviewed;patient/other agree to care plan  -     Row Name 06/19/19 1737          Physical Therapy Goals    Bed Mobility Goal Selection (PT)  bed mobility, PT goal 1  -MJ     Transfer Goal Selection (PT)  transfer, PT goal 1  -MJ     Gait Training Goal Selection (PT)  gait training, PT goal 1  -MJ     Stairs Goal Selection (PT)  stairs, PT goal 1  -MJ     Additional Documentation  Stairs Goal Selection (PT) (Row)  -     Row Name 06/19/19 1737          Bed Mobility Goal 1 (PT)    Activity/Assistive Device (Bed Mobility Goal 1, PT)  sit to supine/supine to sit via log roll  -MJ     [place] Level/Cues Needed (Bed Mobility Goal 1, PT)  conditional independence  -MJ     Time Frame (Bed Mobility Goal 1, PT)  5 days;long term goal (LTG)  -MJ     Progress/Outcomes (Bed Mobility Goal 1, PT)  goal ongoing  -     Row Name 06/19/19 1737          Transfer Goal 1 (PT)    Activity/Assistive Device (Transfer Goal 1, PT)  sit-to-stand/stand-to-sit;walker, rolling  -MJ     [place] Level/Cues Needed (Transfer Goal 1, PT)  conditional independence  -MJ     Time Frame (Transfer Goal 1, PT)  5 days;long term goal (LTG)  -MJ     Progress/Outcome (Transfer Goal 1, PT)  goal ongoing  -     Row Name 06/19/19 1737          Gait Training Goal 1 (PT)    Activity/Assistive Device (Gait Training Goal 1, PT)  gait (walking locomotion);walker, rolling  -MJ     [place] Level (Gait Training Goal 1, PT)  conditional independence  -MJ     Distance (Gait Goal 1, PT)  350 feet  -MJ     Time Frame (Gait Training Goal 1, PT)  5 days;long term goal (LTG)  -MJ     Progress/Outcome (Gait Training Goal 1, PT)  goal ongoing  -     Row Name 06/19/19 1737          Stairs Goal 1 (PT)    Activity/Assistive Device  (Stairs Goal 1, PT)  ascending stairs;descending stairs;using handrail, right;cane, straight  -     [place] Level/Cues Needed (Stairs Goal 1, PT)  contact guard assist  -     Number of Stairs (Stairs Goal 1, PT)  2  -MJ     Time Frame (Stairs Goal 1, PT)  5 days;long term goal (LTG)  -     Progress/Outcome (Stairs Goal 1, PT)  goal ongoing  -     Row Name 06/19/19 1737          Positioning and Restraints    Pre-Treatment Position  in bed  -     Post Treatment Position  chair  -     In Chair  notified nsg;reclined;call light within reach;encouraged to call for assist;exit alarm on  -     Row Name 06/19/19 1737          Living Environment    Home Accessibility  stairs to enter home;tub/shower is not walk in  -       User Key  (r) = Recorded By, (t) = Taken By, (c) = Cosigned By    Initials Name Provider Type    Mari aAlejandra Mitchell, RN Registered Nurse     Vincent Patel, PT Physical Therapist        Physical Therapy Education     Title: PT OT SLP Therapies (In Progress)     Topic: Physical Therapy (In Progress)     Point: Mobility training (Done)     Learning Progress Summary           Patient Acceptance, E,D,H, VU,NR by  at 6/19/2019  5:37 PM    Comment:  Edu re: back precautions, gait mechanics, benefits of mobility. Issued HEP handout                   Point: Body mechanics (Done)     Learning Progress Summary           Patient Acceptance, E,D,H, VU,NR by  at 6/19/2019  5:37 PM    Comment:  Edu re: back precautions, gait mechanics, benefits of mobility. Issued HEP handout                   Point: Precautions (Done)     Learning Progress Summary           Patient Acceptance, E,D,H, VU,NR by  at 6/19/2019  5:37 PM    Comment:  Edu re: back precautions, gait mechanics, benefits of mobility. Issued HEP handout                               User Key     Initials Effective Dates Name Provider Type [facility] 04/03/18 -  Vincent Patel, PT Physical Therapist PT              PT Recommendation  and Plan  Anticipated Discharge Disposition (PT): home with assist, home with home health  Planned Therapy Interventions (PT Eval): balance training, bed mobility training, gait training, home exercise program, patient/family education, stair training, strengthening, transfer training  Therapy Frequency (PT Clinical Impression): daily  Outcome Summary/Treatment Plan (PT)  Anticipated Equipment Needs at Discharge (PT): bedside commode  Anticipated Discharge Disposition (PT): home with assist, home with home health  Plan of Care Reviewed With: patient  Outcome Summary: Pt ambulated 90 feet with CGA and RW, limited by pain. PT will follow to increase strength and progress functional mobility with back precautions. Plan is home with HHPT at discharge  Outcome Measures     Row Name 06/19/19 1737             How much help from another person do you currently need...    Turning from your back to your side while in flat bed without using bedrails?  3  -MJ      Moving from lying on back to sitting on the side of a flat bed without bedrails?  3  -MJ      Moving to and from a bed to a chair (including a wheelchair)?  3  -MJ      Standing up from a chair using your arms (e.g., wheelchair, bedside chair)?  3  -MJ      Climbing 3-5 steps with a railing?  3  -MJ      To walk in hospital room?  3  -MJ      AM-PAC 6 Clicks Score  18  -MJ         Functional Assessment    Outcome Measure Options  AM-PAC 6 Clicks Basic Mobility (PT)  -MJ        User Key  (r) = Recorded By, (t) = Taken By, (c) = Cosigned By    Initials Name Provider Type    Vincent Sotelo, PT Physical Therapist         Time Calculation:   PT Charges     Row Name 06/19/19 1737             Time Calculation    Start Time  1737  -MJ      PT Received On  06/19/19  -      PT Goal Re-Cert Due Date  06/29/19  -         Time Calculation- PT    Total Timed Code Minutes- PT  10 minute(s)  -MJ         Timed Charges    20599 - Gait Training Minutes   8  -MJ      33483 - PT  Therapeutic Activity Minutes  2  -MJ        User Key  (r) = Recorded By, (t) = Taken By, (c) = Cosigned By    Initials Name Provider Type    Vincent Sotelo, PT Physical Therapist        Therapy Charges for Today     Code Description Service Date Service Provider Modifiers Qty    72551314176 HC PT EVAL LOW COMPLEXITY 3 6/19/2019 Vincent Patel, PT GP 1    47263359068 HC GAIT TRAINING EA 15 MIN 6/19/2019 Vincent Patel, PT GP 1          PT G-Codes  Outcome Measure Options: AM-PAC 6 Clicks Basic Mobility (PT)  AM-PAC 6 Clicks Score: 18      Vincent Patel PT  6/19/2019

## 2019-06-20 PROBLEM — E87.1 HYPONATREMIA: Status: ACTIVE | Noted: 2019-06-20

## 2019-06-20 PROBLEM — G89.18 ACUTE POSTOPERATIVE PAIN: Status: ACTIVE | Noted: 2019-06-20

## 2019-06-20 LAB
ANION GAP SERPL CALCULATED.3IONS-SCNC: 13 MMOL/L
BUN BLD-MCNC: 14 MG/DL (ref 6–20)
BUN/CREAT SERPL: 13.7 (ref 7–25)
CALCIUM SPEC-SCNC: 7.7 MG/DL (ref 8.6–10.5)
CHLORIDE SERPL-SCNC: 96 MMOL/L (ref 98–107)
CO2 SERPL-SCNC: 21 MMOL/L (ref 22–29)
CREAT BLD-MCNC: 1.02 MG/DL (ref 0.76–1.27)
DEPRECATED RDW RBC AUTO: 44.8 FL (ref 37–54)
ERYTHROCYTE [DISTWIDTH] IN BLOOD BY AUTOMATED COUNT: 12.9 % (ref 12.3–15.4)
GFR SERPL CREATININE-BSD FRML MDRD: 77 ML/MIN/1.73
GLUCOSE BLD-MCNC: 158 MG/DL (ref 65–99)
HCT VFR BLD AUTO: 34.1 % (ref 37.5–51)
HGB BLD-MCNC: 10.9 G/DL (ref 13–17.7)
MCH RBC QN AUTO: 30.4 PG (ref 26.6–33)
MCHC RBC AUTO-ENTMCNC: 32 G/DL (ref 31.5–35.7)
MCV RBC AUTO: 95.3 FL (ref 79–97)
PLATELET # BLD AUTO: 221 10*3/MM3 (ref 140–450)
PMV BLD AUTO: 8.7 FL (ref 6–12)
POTASSIUM BLD-SCNC: 5 MMOL/L (ref 3.5–5.2)
RBC # BLD AUTO: 3.58 10*6/MM3 (ref 4.14–5.8)
SODIUM BLD-SCNC: 130 MMOL/L (ref 136–145)
WBC NRBC COR # BLD: 11.57 10*3/MM3 (ref 3.4–10.8)

## 2019-06-20 PROCEDURE — 80048 BASIC METABOLIC PNL TOTAL CA: CPT | Performed by: NURSE PRACTITIONER

## 2019-06-20 PROCEDURE — 97535 SELF CARE MNGMENT TRAINING: CPT

## 2019-06-20 PROCEDURE — 97116 GAIT TRAINING THERAPY: CPT

## 2019-06-20 PROCEDURE — 25010000002 MORPHINE PER 10 MG: Performed by: ORTHOPAEDIC SURGERY

## 2019-06-20 PROCEDURE — 97165 OT EVAL LOW COMPLEX 30 MIN: CPT

## 2019-06-20 PROCEDURE — 85027 COMPLETE CBC AUTOMATED: CPT | Performed by: NURSE PRACTITIONER

## 2019-06-20 PROCEDURE — 25010000002 HYDROMORPHONE 1 MG/ML SOLUTION: Performed by: ORTHOPAEDIC SURGERY

## 2019-06-20 PROCEDURE — 97110 THERAPEUTIC EXERCISES: CPT

## 2019-06-20 PROCEDURE — 63710000001 DIPHENHYDRAMINE PER 50 MG: Performed by: NURSE PRACTITIONER

## 2019-06-20 RX ADMIN — COLCHICINE 0.6 MG: 0.6 TABLET, FILM COATED ORAL at 08:55

## 2019-06-20 RX ADMIN — CETIRIZINE HYDROCHLORIDE 10 MG: 10 TABLET, FILM COATED ORAL at 08:55

## 2019-06-20 RX ADMIN — FAMOTIDINE 20 MG: 20 TABLET ORAL at 08:55

## 2019-06-20 RX ADMIN — MORPHINE SULFATE 1 MG: 2 INJECTION, SOLUTION INTRAMUSCULAR; INTRAVENOUS at 00:47

## 2019-06-20 RX ADMIN — MORPHINE SULFATE 1 MG: 2 INJECTION, SOLUTION INTRAMUSCULAR; INTRAVENOUS at 16:56

## 2019-06-20 RX ADMIN — LISINOPRIL 10 MG: 10 TABLET ORAL at 08:55

## 2019-06-20 RX ADMIN — MORPHINE SULFATE 1 MG: 2 INJECTION, SOLUTION INTRAMUSCULAR; INTRAVENOUS at 05:17

## 2019-06-20 RX ADMIN — MORPHINE SULFATE 1 MG: 2 INJECTION, SOLUTION INTRAMUSCULAR; INTRAVENOUS at 11:51

## 2019-06-20 RX ADMIN — ATORVASTATIN CALCIUM 10 MG: 10 TABLET, FILM COATED ORAL at 20:11

## 2019-06-20 RX ADMIN — OXYCODONE HYDROCHLORIDE AND ACETAMINOPHEN 1 TABLET: 10; 325 TABLET ORAL at 04:39

## 2019-06-20 RX ADMIN — METOPROLOL TARTRATE 100 MG: 100 TABLET ORAL at 20:11

## 2019-06-20 RX ADMIN — DIPHENHYDRAMINE HYDROCHLORIDE 50 MG: 50 CAPSULE ORAL at 06:30

## 2019-06-20 RX ADMIN — ALLOPURINOL 300 MG: 300 TABLET ORAL at 08:55

## 2019-06-20 RX ADMIN — METOPROLOL TARTRATE 100 MG: 100 TABLET ORAL at 08:55

## 2019-06-20 RX ADMIN — ZOLPIDEM TARTRATE 5 MG: 5 TABLET ORAL at 20:11

## 2019-06-20 RX ADMIN — LEVOTHYROXINE SODIUM 50 MCG: 50 TABLET ORAL at 04:39

## 2019-06-20 RX ADMIN — OXYCODONE HYDROCHLORIDE AND ACETAMINOPHEN 1 TABLET: 10; 325 TABLET ORAL at 20:11

## 2019-06-20 RX ADMIN — OXYCODONE HYDROCHLORIDE AND ACETAMINOPHEN 1 TABLET: 10; 325 TABLET ORAL at 14:49

## 2019-06-20 RX ADMIN — DULOXETINE HYDROCHLORIDE 60 MG: 60 CAPSULE, DELAYED RELEASE ORAL at 08:55

## 2019-06-20 RX ADMIN — FAMOTIDINE 20 MG: 20 TABLET ORAL at 20:11

## 2019-06-20 RX ADMIN — HYDROMORPHONE HYDROCHLORIDE 1 MG: 1 INJECTION, SOLUTION INTRAMUSCULAR; INTRAVENOUS; SUBCUTANEOUS at 07:46

## 2019-06-20 RX ADMIN — CYCLOBENZAPRINE HYDROCHLORIDE 10 MG: 10 TABLET, FILM COATED ORAL at 08:56

## 2019-06-20 RX ADMIN — OXYCODONE HYDROCHLORIDE AND ACETAMINOPHEN 1 TABLET: 10; 325 TABLET ORAL at 08:55

## 2019-06-20 NOTE — PLAN OF CARE
Problem: Patient Care Overview  Goal: Plan of Care Review  Outcome: Ongoing (interventions implemented as appropriate)   06/20/19 1127   OTHER   Outcome Summary Pt increased gait distance to 110 feet with SBA and RW, limited by pain. Pt required less assist for functional mobility and recalls back precautions. Will continue to progress mobility as able.   Coping/Psychosocial   Plan of Care Reviewed With patient   Plan of Care Review   Progress improving

## 2019-06-20 NOTE — PLAN OF CARE
Problem: Patient Care Overview  Goal: Plan of Care Review   06/20/19 1630   OTHER   Outcome Summary Pt alert and oriented. Lumbar dressing CDI, hemovac drain remains in place. up in chair. Assist x1 with walker and gait belt. Pt requiring PO and IV pain medications. VSS

## 2019-06-20 NOTE — PROGRESS NOTES
Adult Nutrition  Assessment/PES    Patient Name:  Mckay Hunt  YOB: 1965  MRN: [id number]  Admit Date:  6/19/2019    Assessment Date:  6/20/2019      Reason for Assessment     Row Name 06/20/19 1625          Reason for Assessment    Reason For Assessment  -- 25 mins, r/t food prefs.           Nutrition/Diet History     Row Name 06/20/19 1626          Nutrition/Diet History    Food Allergies  -- pt states food allergy to egg yolks (not egg whites) and soy.                  Nutrition Prescription Ordered     Row Name 06/20/19 1625          Nutrition Prescription PO    Current PO Diet  Regular     Common Modifiers  Consistent Carbohydrate   pt states no h/o DM; RD will liberalize diet to regular.           Nutrition Intervention     Row Name 06/20/19 1626          Nutrition Intervention    RD/Tech Action  Interview for preference;Menu provided;Menu adjusted;Supplement offered/refused;Encourage intake   RD spoke pt and with  staff about pt's food requests for dinner tonight and breakfast tomorrow.            Education/Evaluation     Row Name 06/20/19 1626          Monitor/Evaluation    Monitor  Per protocol           Electronically signed by:  María Justin MS,RD,LD  06/20/19 4:30 PM

## 2019-06-20 NOTE — DISCHARGE PLACEMENT REQUEST
"Mckay Hunt (53 y.o. Male)   Home Health SN and PT referral.  Patient is being discharged tomorrow and you can see him next week.  Thanks.  From Sara Guzmán RN BSN, Case Management, ph [phone].    Date of Birth Social Security Number Address Home Phone MRN    1965  [address]  [place] [id number] [phone] [id number]    [place] Marital Status          Taoism Single       Admission Date Admission Type Admitting Provider Attending Provider Department, Room/Bed    19 Elective Milan Valdez MD Vaughan, John J, MD [facility] 3G, S362/1    Discharge Date Discharge Disposition Discharge Destination                       Attending Provider:  Milan Valdez MD    Allergies:  Mushroom Extract Complex, Indocin [Indomethacin], Other, Soybean-containing Drug Products, Zosyn [Piperacillin Sod-tazobactam So]    Isolation:  None   Infection:  None   Code Status:  CPR    Ht:  170.2 cm (67\")   Wt:  109 kg (241 lb)    Admission Cmt:  None   Principal Problem:  S/P lumbar fusion [Z98.1]                 Active Insurance as of 2019     Primary Coverage     Payor Plan Insurance Group Employer/Plan Group    [employer] [place]ID [facility]     Payor Plan Address Payor Plan Phone Number Payor Plan Fax Number Effective Dates    PO  [phone]  2016 - None Entered    [facility] [id number]       Subscriber Name Subscriber Birth Date Member ID       MCKAY HUNT 1965 [id number]                 Emergency Contacts      (Rel.) Home Phone Work Phone Mobile Phone    Carolina Hunt (Daughter) -- -- [phone]        [facility]  [facility] [address]  Phone:  [phone]  Fax:   Date: 2019      Ambulatory Referral to Home Health     Patient:  Mckay Hunt MRN:  [id number]   [address]  [facility] [address] :  1965  SSN:    Phone: [phone] Sex:  " M      INSURANCE PAYOR PLAN GROUP # SUBSCRIBER ID   Primary:    [employer] [place]ICAID [id number] [place] [id number]      Referring Provider Information:  MARGE VALDEZ Phone: [phone] Fax:       Referral Information:   # Visits:  1 Referral Type: Home Health [42]   Urgency:  Routine Referral Reason: Specialty Services Required   Start Date: Jun 20, 2019 End Date:  To be determined by Insurer   Diagnosis: Impaired functional mobility, balance, gait, and endurance (Z74.09 [ICD-10-CM] V49.89 [ICD-9-CM])  S/P lumbar fusion (Z98.1 [ICD-10-CM] V45.4 [ICD-9-CM])      Refer to Dept:   Refer to Provider:   Refer to Facility:       Face to Face Visit Date: 6/20/2019  Follow-up Provider for Plan of Care? I treated the patient in an acute care facility and will not continue treatment after discharge.  Follow-up Provider: KYLEIGH EUGENE [[id number]]  Reason/Clinical Findings: s/p back surgery  Describe mobility limitations that make leaving home difficult: Impaired functional mobility, balance, gait and endurance.  Nursing/Therapeutic Services Requested: Skilled Nursing  Nursing/Therapeutic Services Requested: Physical Therapy  Skilled nursing orders: Other (Assessment)  PT orders: Other  Frequency: Other     This document serves as a request of services and does not constitute Insurance authorization or approval of services.  To determine eligibility, please contact the members Insurance carrier to verify and review coverage.     If you have medical questions regarding this request for services. Please contact [address] at [phone] between the hours of 8:00am - 5:00pm (Mon-Fri).       Verbal Order Mode: Per protocol: cosign required  Authorizing Provider: Marge Valdez MD  Authorizing Provider's NPI: [id number]     Order Entered By: Sara Guzmán 6/20/2019  9:22 AM     Electronically signed by:                 History & Physical      Lily Espinoza MD at 6/19/2019 11:55 AM          Patient Name:  Mckay Hunt  MRN: [id number]  : 1965  DOS: 2019    Attending: Milan Valdez MD    Primary Care Provider: Malick Cruz DO      Chief complaint:  Low back pain    Subjective   Patient is a 53 y.o. male presented for lumbar fusion by Dr. Valdez.    He underwent surgery under GA. He tolerated surgery well and is admitted for further medical management.     He is known to us from previous admission for left hip replacement in Dec 2016; which he recovered well.    PROCEDURE PERFORMED:  1.  Transforaminal lumbar interbody fusion, L4-L5, with DePuy interbody fusion cage and bone graft.  2.  Segmental instrumentation at L4-L5 with DePuy transpedicular fixation.  3.  Posterolateral fusion at L4-L5 with bone graft.  4.  Alatorre Barnes type osteotomy at L4-L5 to facilitate restoration of lordosis and reduction of spondylolisthesis.  5.  Harvesting of bone graft locally from spinous processes and lamina.    When seen postop he doing well. He is having moderate back pain. He denies nausea, shortness of breath or chest pain. No hx of DVT or PE.    (Above is noted, agree.  Doing well when seen in his room afterwards.  Some postoperative pain.  No pain down the lower extremities.  No shortness breath no chest pain.  Has not yet voided, has not yet ambulated.)wy    Allergies:  Allergies   Allergen Reactions   • Eggs Or Egg-Derived Products Swelling   • Mushroom Extract Complex Swelling   • Indocin [Indomethacin] Rash   • Other Rash     polyester   • Soybean-Containing Drug Products Rash   • Zosyn [Piperacillin Sod-Tazobactam So] Rash       Meds:  Medications Prior to Admission   Medication Sig Dispense Refill Last Dose   • allopurinol (ZYLOPRIM) 300 MG tablet Take 300 mg by mouth Daily.   2019 at 1000   • colchicine 0.6 MG capsule capsule Take 0.6 mg by mouth Daily.   Past Week at Unknown time   • cyclobenzaprine (FLEXERIL) 10 MG tablet Take 10 mg by mouth 3 (Three) Times a Day As Needed for Muscle  Spasms.   6/18/2019 at 1700   • diphenhydrAMINE (BENADRYL) 25 mg capsule Take 50 mg by mouth Every 6 (Six) Hours As Needed for itching.   Past Week at Unknown time   • DULoxetine (CYMBALTA) 60 MG capsule Take 60 mg by mouth Daily.   6/19/2019 at 0330   • fluticasone (FLONASE) 50 MCG/ACT nasal spray 2 sprays into the nostril(s) as directed by provider Daily.   Past Week at Unknown time   • levothyroxine (SYNTHROID, LEVOTHROID) 50 MCG tablet Take 50 mcg by mouth Daily.   6/19/2019 at 0330   • lisinopril (PRINIVIL,ZESTRIL) 10 MG tablet Take 10 mg by mouth Daily.   6/18/2019 at 1000   • LORATADINE PO Take 10 mg by mouth Daily.   6/18/2019 at 1000   • metoprolol tartrate (LOPRESSOR) 100 MG tablet Take 100 mg by mouth 2 (Two) Times a Day.   6/18/2019 at 2130   • Multiple Vitamins-Minerals (MULTIVITAMIN ADULT PO) Take 1 capsule by mouth Daily.   6/18/2019 at 1000   • ondansetron (ZOFRAN) 4 MG tablet Take 4 mg by mouth Every 8 (Eight) Hours As Needed for Nausea or Vomiting.   Past Week at Unknown time   • simvastatin (ZOCOR) 20 MG tablet Take 20 mg by mouth Daily.   6/18/2019 at 2130       History:   Past Medical History:   Diagnosis Date   • Abnormal gait    • Anesthesia complication     perks pt up instead of downer,     trouble to put under   • Arthritis    • Cellulitis    • Disease of thyroid gland     hypothyroidism   • Gout    • Hip pain     left   • Hypertension    • Muscle weakness    • Spondylolisthesis    • Tinnitus    • Wears glasses     readers     Past Surgical History:   Procedure Laterality Date   • CARPAL TUNNEL RELEASE      bilat  x2   • ELBOW PROCEDURE      bilat    • JOINT REPLACEMENT Right 2010    HIP   • TONSILLECTOMY AND ADENOIDECTOMY      had to go in twice    • TOTAL HIP ARTHROPLASTY Left 12/19/2016    Procedure: TOTAL HIP ARTHROPLASTY ANTERIOR LEFT;  Surgeon: Bruce Berg MD;  Location: [facility];  Service:    • WISDOM TOOTH EXTRACTION      al 4 removed      History reviewed. No pertinent family  "history.  Social History     Tobacco Use   • Smoking status: Never Smoker   • Smokeless tobacco: Never Used   Substance Use Topics   • Alcohol use: Yes     Alcohol/week: 3.6 oz     Types: 6 Cans of beer per week   • Drug use: No   He lives alone and has 1 child. He is trying to get disability.     Review of Systems  Pertinent items are noted in HPI, all other systems reviewed and negative    Vital Signs  Visit Vitals  /68   Pulse 93   Temp 98.1 °F (36.7 °C) (Axillary)   Resp 16   Ht 170.2 cm (67\")   Wt 109 kg (241 lb)   SpO2 93%   BMI 37.75 kg/m²       Physical Exam:    General Appearance:    Alert, cooperative, in no acute distress   Head:    Normocephalic, without obvious abnormality, atraumatic   Eyes:            Lids and lashes normal, conjunctivae and sclerae normal, no   icterus, no pallor, corneas clear   Ears:    Ears appear intact with no abnormalities noted   Back:   Surgical dressing CDI. HV present   Lungs:     Clear to auscultation,respirations regular, even and                   unlabored    Heart:    Regular rhythm and normal rate, normal S1 and S2, no            murmur, no gallop   Abdomen:     Normal bowel sounds, no masses, no organomegaly, soft        non-tender, non-distended, no guarding, no rebound                 Tenderness.  Obese.   Genitalia:    Deferred   Extremities:   Moves all extremities well, no edema, no cyanosis, no              redness   Pulses:   Pulses palpable and equal bilaterally   Skin:   No bleeding, bruising or rash   Neurologic:   Cranial nerves 2 - 12 grossly intact, sensation intact. Flexion and dorsiflexion intact bilateral feet.        I reviewed the patient's new clinical results.       Results from last 7 days   Lab Units 06/14/19  1409   WBC 10*3/mm3 9.95   HEMOGLOBIN g/dL 15.3   HEMATOCRIT % 46.6   PLATELETS 10*3/mm3 207     Results from last 7 days   Lab Units 06/19/19  0645   POTASSIUM mmol/L 4.0     Lab Results   Component Value Date    HGBA1C 5.20 " 06/14/2019       Assessment and Plan:     S/P lumbar fusion    Back pain    Hypothyroid    HTN (hypertension)    HLD (hyperlipidemia)    Obesity    Plan  1. PT/OT- ambulate  2. Pain control-prns   3. IS-encourage  [facility]  5. Bowel regimen  6. Resume home medications as appropriate  7. Monitor post-op labs  8. Discharge planning     HTN, HLD  - Continue home lisinopril, lopressor and statin  - Monitor BP   - Holding parameters for BP meds  - Labetalol PRN for SBP>170    Hypothyroid  - Continue home synthroid      RADHA Pereira  06/19/19  2:07 PM     I have personally performed the evaluation on this patient. My history is consistent  with HPI obtained. My exam findings are listed above. I have personally reviewed and discussed the above formulated treatment plan with pt and AH. APRN.wy.      Electronically signed by Lily Espinoza MD at 6/19/2019  4:14 PM     SeeKellie PA at 6/19/2019  6:57 AM          Pre-Op H&P  Mckay Hunt  [id number]  1965    Chief complaint: low back pain    HPI:    Patient is a 53 y.o.male who presents with low back pain and bilateral buttock pain     Review of Systems:  General ROS: negative for chills, fever or skin lesions;  No changes since last office visit  Cardiovascular ROS: no chest pain or dyspnea on exertion  Respiratory ROS: no cough, shortness of breath, or wheezing    Allergies:   Allergies   Allergen Reactions   • Eggs Or Egg-Derived Products Swelling   • Mushroom Extract Complex Swelling   • Indocin [Indomethacin] Rash   • Other Rash     polyester   • Soybean-Containing Drug Products Rash   • Zosyn [Piperacillin Sod-Tazobactam So] Rash       Home Meds:    No current facility-administered medications on file prior to encounter.      Current Outpatient Medications on File Prior to Encounter   Medication Sig Dispense Refill   • colchicine 0.6 MG capsule capsule Take 0.6 mg by mouth Daily.     • diphenhydrAMINE (BENADRYL) 25 mg capsule Take 50  "mg by mouth Every 6 (Six) Hours As Needed for itching.     • lisinopril (PRINIVIL,ZESTRIL) 10 MG tablet Take 10 mg by mouth Daily.         PMH:   Past Medical History:   Diagnosis Date   • Abnormal gait    • Anesthesia complication     perks pt up instead of downer,     trouble to put under   • Arthritis    • Cellulitis    • Disease of thyroid gland     hypothyroidism   • Gout    • Hip pain     left   • Hypertension    • Muscle weakness    • Spondylolisthesis    • Tinnitus    • Wears glasses     readers     PSH:    Past Surgical History:   Procedure Laterality Date   • CARPAL TUNNEL RELEASE      bilat  x2   • ELBOW PROCEDURE      bilat    • JOINT REPLACEMENT Right 2010    HIP   • TONSILLECTOMY AND ADENOIDECTOMY      had to go in twice    • TOTAL HIP ARTHROPLASTY Left 12/19/2016    Procedure: TOTAL HIP ARTHROPLASTY ANTERIOR LEFT;  Surgeon: Bruce Berg MD;  [facility];  Service:    • WISDOM TOOTH EXTRACTION      al 4 removed        Immunization History:  Influenza: no  Pneumococcal: no  Tetanus: yes    Social History:   Tobacco:   Social History     Tobacco Use   Smoking Status Never Smoker   Smokeless Tobacco Never Used      Alcohol:     Social History     Substance and Sexual Activity   Alcohol Use Yes   • Alcohol/week: 3.6 oz   • Types: 6 Cans of beer per week       Vitals:           /94 (BP Location: Right arm, Patient Position: Lying)   Pulse 92   Temp 97.8 °F (36.6 °C) (Temporal)   Resp 18   Ht 170.2 cm (67\")   Wt 109 kg (241 lb)   SpO2 96%   BMI 37.75 kg/m²      Physical Exam:  General Appearance:    Alert, cooperative, no distress, appears stated age   Head:    Normocephalic, without obvious abnormality, atraumatic   Lungs:     Clear to auscultation bilaterally, respirations unlabored    Heart:   Regular rate and rhythm, S1 and S2 normal, no murmur, rub    or gallop    Abdomen:    Soft, non-tender.  +bowel sounds   Breast Exam:    deferred   Genitalia:    deferred   Extremities:   " Extremities normal, atraumatic, no cyanosis or edema   Skin:   Skin color, texture, turgor normal, no rashes or lesions   Neurologic:   Grossly intact   Results Review  LABS:  Lab Results   Component Value Date    WBC 9.95 06/14/2019    HGB 15.3 06/14/2019    HCT 46.6 06/14/2019    MCV 92.6 06/14/2019     06/14/2019    NEUTROABS 6.4 05/10/2019    GLUCOSE 89 12/22/2016    BUN 9 05/10/2019    CREATININE 0.9 05/10/2019    EGFRIFNONA 120 12/22/2016     05/10/2019    K 3.9 05/10/2019    CL 98 (L) 05/10/2019    CO2 22 05/10/2019    CALCIUM 9.4 05/10/2019    ALBUMIN 4.5 05/10/2019    AST 78 (H) 05/10/2019    ALT 52 05/10/2019    BILITOT 0.9 05/10/2019       RADIOLOGY:  Imaging Results (last 72 hours)     ** No results found for the last 72 hours. **          I reviewed the patient's new clinical results.    Cancer Staging (if applicable)  Cancer Patient: __ yes __no __unknown; If yes, clinical stage T:__ N:__M:__, stage group or __N/A    Impression: 53 year old male presenting with L4-5 spondylolithesis    Plan: L4-5 decompression and fusion with instrumentation    NESHA Byers   6/19/2019   6:57 AM    Electronically signed by Milan Valdez MD at 6/19/2019 10:53 AM       Hospital Medications (active)       Dose Frequency Start End    acetaminophen (TYLENOL) tablet 650 mg 650 mg Every 4 Hours PRN 6/19/2019     Sig - Route: Take 2 tablets by mouth Every 4 (Four) Hours As Needed for Mild Pain . - Oral    allopurinol (ZYLOPRIM) tablet 300 mg 300 mg Daily 6/19/2019     Sig - Route: Take 1 tablet by mouth Daily. - Oral    atorvastatin (LIPITOR) tablet 10 mg 10 mg Nightly 6/19/2019     Sig - Route: Take 1 tablet by mouth Every Night. - Oral    bisacodyl (DULCOLAX) EC tablet 5 mg 5 mg Daily PRN 6/19/2019     Sig - Route: Take 1 tablet by mouth Daily As Needed for Constipation. - Oral    bisacodyl (DULCOLAX) suppository 10 mg 10 mg Daily PRN 6/19/2019     Sig - Route: Insert 1 suppository into the rectum Daily  "As Needed for Constipation. - Rectal    cetirizine (zyrTEC) tablet 10 mg 10 mg Daily 6/19/2019     Sig - Route: Take 1 tablet by mouth Daily. - Oral    colchicine tablet 0.6 mg 0.6 mg Daily 6/19/2019     Sig - Route: Take 1 tablet by mouth Daily. - Oral    cyclobenzaprine (FLEXERIL) tablet 10 mg 10 mg 3 Times Daily PRN 6/19/2019     Sig - Route: Take 1 tablet by mouth 3 (Three) Times a Day As Needed for Muscle Spasms. - Oral    diphenhydrAMINE (BENADRYL) capsule 50 mg 50 mg Every 6 Hours PRN 6/19/2019     Sig - Route: Take 1 capsule by mouth Every 6 (Six) Hours As Needed for Itching. - Oral    DULoxetine (CYMBALTA) DR capsule 60 mg 60 mg Daily 6/20/2019     Sig - Route: Take 1 capsule by mouth Daily. - Oral    famotidine (PEPCID) injection 20 mg 20 mg Every 12 Hours Scheduled 6/19/2019     Sig - Route: Infuse 2 mL into a venous catheter Every 12 (Twelve) Hours. - Intravenous    Linked Group 1:  \"Or\" Linked Group Details        famotidine (PEPCID) tablet 20 mg 20 mg Every 12 Hours Scheduled 6/19/2019     Sig - Route: Take 1 tablet by mouth Every 12 (Twelve) Hours. - Oral    Linked Group 1:  \"Or\" Linked Group Details        fluticasone (FLONASE) 50 MCG/ACT nasal spray 2 spray 2 spray Daily PRN 6/19/2019     Sig - Route: 2 sprays into the nostril(s) as directed by provider Daily As Needed for Allergies. - Nasal    HYDROmorphone (DILAUDID) injection 1 mg 1 mg Every 4 Hours PRN 6/19/2019 6/29/2019    Sig - Route: Inject 1 mL into the appropriate muscle as directed by prescriber Every 4 (Four) Hours As Needed for Severe Pain . - Intramuscular    Linked Group 2:  \"And\" Linked Group Details        labetalol (NORMODYNE,TRANDATE) injection 10 mg 10 mg Every 4 Hours PRN 6/19/2019     Sig - Route: Infuse 2 mL into a venous catheter Every 4 (Four) Hours As Needed for High Blood Pressure (SBP>170). - Intravenous    levothyroxine (SYNTHROID, LEVOTHROID) tablet 50 mcg 50 mcg Every Early Morning 6/20/2019     Sig - Route: Take 1 " "tablet by mouth Every Morning. - Oral    lisinopril (PRINIVIL,ZESTRIL) tablet 10 mg 10 mg Daily 6/19/2019     Sig - Route: Take 1 tablet by mouth Daily. - Oral    metoprolol tartrate (LOPRESSOR) tablet 100 mg 100 mg Every 12 Hours Scheduled 6/19/2019     Sig - Route: Take 1 tablet by mouth Every 12 (Twelve) Hours. - Oral    morphine injection 1 mg 1 mg Every 4 Hours PRN 6/19/2019 6/29/2019    Sig - Route: Infuse 0.5 mL into a venous catheter Every 4 (Four) Hours As Needed for Moderate Pain . - Intravenous    Linked Group 3:  \"And\" Linked Group Details        naloxone (NARCAN) injection 0.4 mg 0.4 mg Every 5 Minutes PRN 6/19/2019     Sig - Route: Infuse 1 mL into a venous catheter Every 5 (Five) Minutes As Needed for Respiratory Depression. - Intravenous    Linked Group 3:  \"And\" Linked Group Details        naloxone (NARCAN) injection 0.4 mg 0.4 mg Every 5 Minutes PRN 6/19/2019     Sig - Route: Infuse 1 mL into a venous catheter Every 5 (Five) Minutes As Needed for Respiratory Depression. - Intravenous    Linked Group 2:  \"And\" Linked Group Details        ondansetron (ZOFRAN) injection 4 mg 4 mg Every 6 Hours PRN 6/19/2019     Sig - Route: Infuse 2 mL into a venous catheter Every 6 (Six) Hours As Needed for Nausea or Vomiting. - Intravenous    oxyCODONE-acetaminophen (PERCOCET)  MG per tablet 1 tablet 1 tablet Every 4 Hours PRN 6/19/2019 6/29/2019    Sig - Route: Take 1 tablet by mouth Every 4 (Four) Hours As Needed for Severe Pain . - Oral    sodium chloride 0.9 % bolus 500 mL 500 mL 3 Times Daily PRN 6/19/2019     Sig - Route: Infuse 500 mL into a venous catheter 3 (Three) Times a Day As Needed (for SBP less than 90). - Intravenous    sodium chloride 0.9 % flush 3 mL 3 mL Every 12 Hours Scheduled 6/19/2019     Sig - Route: Infuse 3 mL into a venous catheter Every 12 (Twelve) Hours. - Intravenous    sodium chloride 0.9 % flush 3-10 mL 3-10 mL As Needed 6/19/2019     Sig - Route: Infuse 3-10 mL into a venous " "catheter As Needed for Line Care. - Intravenous    sodium chloride 0.9 % with KCl 20 mEq/L infusion 100 mL/hr Continuous 6/19/2019     Sig - Route: Infuse 100 mL/hr into a venous catheter Continuous. - Intravenous    vancomycin 1750 mg/500 mL 0.9% NS IVPB ([facility]) 15 mg/kg × 109 kg Every 12 Hours 6/19/2019 6/19/2019    Sig - Route: Infuse 500 mL into a venous catheter Every 12 (Twelve) Hours. - Intravenous    zolpidem (AMBIEN) tablet 5 mg 5 mg Nightly PRN 6/19/2019 6/29/2019    Sig - Route: Take 1 tablet by mouth At Night As Needed for Sleep. - Oral    fentaNYL citrate (PF) (SUBLIMAZE) injection 50 mcg (Discontinued) 50 mcg Every 5 Minutes PRN 6/19/2019 6/19/2019    Sig - Route: Infuse 1 mL into a venous catheter Every 5 (Five) Minutes As Needed for Moderate Pain . - Intravenous    Reason for Discontinue: Patient Transfer    HYDROmorphone (DILAUDID) injection 0.5 mg (Discontinued) 0.5 mg Every 10 Minutes PRN 6/19/2019 6/19/2019    Sig - Route: Infuse 0.5 mL into a venous catheter Every 10 (Ten) Minutes As Needed for Severe Pain . - Intravenous    Reason for Discontinue: Patient Transfer    lactated ringers infusion (Discontinued) 9 mL/hr Continuous PRN 6/19/2019 6/19/2019    Sig - Route: Infuse 9 mL/hr into a venous catheter Continuous As Needed (Start Prior to Surgery). - Intravenous    Reason for Discontinue: Patient Transfer    ondansetron (ZOFRAN) injection 4 mg (Discontinued) 4 mg Every 6 Hours PRN 6/19/2019 6/19/2019    Sig - Route: Infuse 2 mL into a venous catheter Every 6 (Six) Hours As Needed for Nausea or Vomiting. - Intravenous    Reason for Discontinue: Duplicate order    promethazine (PHENERGAN) injection 6.25 mg (Discontinued) 6.25 mg Once As Needed 6/19/2019 6/19/2019    Sig - Route: Infuse 0.25 mL into a venous catheter 1 (One) Time As Needed for Nausea or Vomiting. - Intravenous    Reason for Discontinue: Patient Transfer    Linked Group 4:  \"Or\" Linked Group Details        promethazine (PHENERGAN) " "injection 6.25 mg (Discontinued) 6.25 mg Once As Needed 6/19/2019 6/19/2019    Sig - Route: Inject 0.25 mL into the appropriate muscle as directed by prescriber 1 (One) Time As Needed for Nausea or Vomiting. - Intramuscular    Reason for Discontinue: Patient Transfer    Linked Group 4:  \"Or\" Linked Group Details        promethazine (PHENERGAN) suppository 25 mg (Discontinued) 25 mg Once As Needed 6/19/2019 6/19/2019    Sig - Route: Insert 1 suppository into the rectum 1 (One) Time As Needed for Nausea or Vomiting. - Rectal    Reason for Discontinue: Patient Transfer    Linked Group 4:  \"Or\" Linked Group Details        promethazine (PHENERGAN) tablet 25 mg (Discontinued) 25 mg Once As Needed 6/19/2019 6/19/2019    Sig - Route: Take 1 tablet by mouth 1 (One) Time As Needed for Nausea or Vomiting. - Oral    Reason for Discontinue: Patient Transfer    Linked Group 4:  \"Or\" Linked Group Details                 Operative/Procedure Notes (most recent note)      Milan Valdez MD at 6/19/2019 10:45 AM        PREOPERATIVE DIAGNOSIS:  L4-L5 spondylolisthesis with radiculopathy.    POSTOPERATIVE DIAGNOSIS:  L4-L5 spondylolisthesis with radiculopathy.    PROCEDURE PERFORMED:  1.  Transforaminal lumbar interbody fusion, L4-L5, with DePuy interbody fusion cage and bone graft.  2.  Segmental instrumentation at L4-L5 with DePuy transpedicular fixation.  3.  Posterolateral fusion at L4-L5 with bone graft.  4.  Alatorre Barnes type osteotomy at L4-L5 to facilitate restoration of lordosis and reduction of spondylolisthesis.  5.  Harvesting of bone graft locally from spinous processes and lamina.    SURGEON:  Milan Valdez MD    ASSISTANT:  REBECA Banks    ANESTHESIA:  General.    OPERATIVE COURSE:  Patient was given a preoperative dose of intravenous vancomycin.  He was given a general anesthetic.  He was placed in the prone position.  The back was prepped and draped sterilely.  A midline incision was made.  The fascia was split.  " The paraspinal musculature was elevated.  First pedicle screws were placed.  This was done under C-arm guidance.  I identified the pedicles of L4 and L5 bilaterally.  Pedicle screws were placed uneventfully.  They appeared in good position under C-arm.  They obtained good bony purchase.  Two rods contoured in lordosis were placed in the screws and the set screws were applied.  All hardware appeared in good position on C-arm.    Next, to facilitate reduction, I did a Alatorre Barnes type osteotomy.  The lower half of the lamina of L4 was removed.  The facets were removed.  This made the motion segment more mobile.    Next, an interbody fusion was performed.  This was done to the left side.  An annular window was created between the exiting nerve root above the traversing nerve root.  A complete diskectomy was performed using curettes and pituitaries and end plate daniela.  The cage selected was 28 in length and 11 mm in height.  Prior to placing the cage, I irrigated the disc space.  I packed bone graft in the anterior disc space.  The cage was then impacted tangentially across the disc space and locked into place well.  Once the cage was in place, I applied compression across the screws to further lock the cage in place.  The set screws were then retightened.    A posterolateral fusion was performed.  The transverse processes of L4 and L5 were decorticated.  Bone graft was packed in the posterolateral gutters on the left and ride sides over L4 and L5.    It should be noted bone graft was harvested locally from the lamina and spinous processes.  This bone graft was morselized.  It was mixed with Vivigen Allograft.  The above material was used for the fusion.    Final C-arm images showed all hardware in good position.  A final torque tightening was done of the screws.  A Hemovac drain was placed.  Exposed dura was covered with thrombin soaked Gelfoam.  The wound appeared dry at completion.    The wound was closed in  "layers using Vicryl.  A sterile dressing was applied.  Patient was awakened and transported to recovery room in stable condition.        Electronically signed by Milan Valdez MD at 2019  6:32 PM          Physician Progress Notes (most recent note)      Elo Lazo APRN at 2019 11:01 AM          IM progress note      Mckay Hunt  [id number]  1965     LOS: 1 day     Attending: Milan Valdez MD    Primary Care Provider: Malick Cruz DO      Chief Complaint/Reason for visit:  Back pain    Subjective   Doing well. Adequate pain control. Denies f/c/n/v/sob/cp.    Objective     Vital Signs  Visit Vitals  /83 (BP Location: Right arm, Patient Position: Lying)   Pulse 95   Temp 97.4 °F (36.3 °C) (Oral)   Resp 18   Ht 170.2 cm (67\")   Wt 109 kg (241 lb)   SpO2 96%   BMI 37.75 kg/m²     Temp (24hrs), Av °F (36.7 °C), Min:97.4 °F (36.3 °C), Max:98.6 °F (37 °C)      Nutrition: PO    Respiratory: RA    Physical Therapy: Pt ambulated 90 feet with CGA and RW, limited by pain. PT will follow to increase strength and progress functional mobility with back precautions. Plan is home with HHPT at discharge    Physical Exam:     General Appearance:    Alert, cooperative, in no acute distress   Head:    Normocephalic, without obvious abnormality, atraumatic    Lungs:     Normal effort, symmetric chest rise, no crepitus, clear to      auscultation bilaterally             Heart:    Regular rhythm and normal rate, normal S1 and S2   Abdomen:     Normal bowel sounds, no masses, no organomegaly, soft        non-tender, non-distended, no guarding, no rebound                tenderness   Extremities:   No clubbing, cyanosis or edema.  No deformities.    Pulses:   Pulses palpable and equal bilaterally   Skin:   No bleeding, bruising or rash. Back dressing CDI. HV present   Neurologic:   Moves all extremities with no obvious focal motor deficit.  Cranial nerves 2 - 12 grossly intact. Flexion and " dorsiflexion intact bilateral feet.       Results Review:     I reviewed the patient's new clinical results.   Results from last 7 days   Lab Units 06/20/19  0544 06/14/19  1409   WBC 10*3/mm3 11.57* 9.95   HEMOGLOBIN g/dL 10.9* 15.3   HEMATOCRIT % 34.1* 46.6   PLATELETS 10*3/mm3 221 207     Results from last 7 days   Lab Units 06/20/19  0544 06/19/19  0645   SODIUM mmol/L 130*  --    POTASSIUM mmol/L 5.0 4.0   CHLORIDE mmol/L 96*  --    CO2 mmol/L 21.0*  --    BUN mg/dL 14  --    CREATININE mg/dL 1.02  --    CALCIUM mg/dL 7.7*  --    GLUCOSE mg/dL 158*  --      I reviewed the patient's new imaging including images and reports.    All medications reviewed.     allopurinol 300 mg Oral Daily   atorvastatin 10 mg Oral Nightly   cetirizine 10 mg Oral Daily   colchicine 0.6 mg Oral Daily   DULoxetine 60 mg Oral Daily   famotidine 20 mg Intravenous Q12H   Or      famotidine 20 mg Oral Q12H   levothyroxine 50 mcg Oral Q AM   lisinopril 10 mg Oral Daily   metoprolol tartrate 100 mg Oral Q12H   sodium chloride 3 mL Intravenous Q12H       Assessment/Plan     S/P lumbar fusion    Back pain    Hypothyroid    HTN (hypertension)    Leukocytosis, mild, likely reactive    Acute blood loss anemia, mild, asymptomatic    HLD (hyperlipidemia)    Acute postoperative pain    Hyponatremia      Plan  1. PT/OT- ambulate  2. Pain control-prns   3. IS-encouraged  4. DVT proph- [facility]  5. Bowel regimen  6. Monitor post-op labs  7. DC planning for home fri or sat    HTN, HLD  - Continue home lisinopril, lopressor and statin  - Monitor BP   - Holding parameters for BP meds  - Labetalol PRN for SBP>170     Hypothyroid  - Continue home synthroid      RADHA Pereira  06/20/19  11:02 AM    Electronically signed by Elo Lazo APRN at 6/20/2019 11:03 AM       Consult Notes (most recent note)     No notes of this type exist for this encounter.           Physical Therapy Notes (most recent note)      Vincent Patel, PT at 6/20/2019 11:57 AM   Version 1 of 1         Acute Care - Physical Therapy Treatment Note  [facility]     Patient Name: Mckay Hunt  : 1965  MRN: [id number]  Today's Date: 2019  Onset of Illness/Injury or Date of Surgery: 19  Date of Referral to PT: 19  Referring Physician: MD José Miguel     Admit Date: 2019    Visit Dx:    ICD-10-CM ICD-9-CM   1. Impaired functional mobility, balance, gait, and endurance Z74.09 V49.89   2. S/P lumbar fusion Z98.1 V45.4   3. Impaired mobility and ADLs Z74.09 799.89     Patient Active Problem List   Diagnosis   • Arthritis of left hip   • Hypothyroid   • HTN (hypertension)   • Status post total replacement of left hip   • Leukocytosis, mild, likely reactive   • Acute blood loss anemia, mild, asymptomatic   • Back pain   • S/P lumbar fusion   • HLD (hyperlipidemia)   • Acute postoperative pain   • Hyponatremia       Therapy Treatment    Rehabilitation Treatment Summary     Row Name 19 1127             Treatment Time/Intention    Discipline  physical therapist  -      Document Type  therapy note (daily note)  -      Subjective Information  complains of;pain  -MJ      Mode of Treatment  physical therapy  -MJ      Patient/Family Observations  Pt sitting UIC  -      Care Plan Review  patient/other agree to care plan  -MJ      Patient Effort  good  -MJ      Existing Precautions/Restrictions  fall;spinal;other (see comments) hemovac  -MJ      Recorded by [MJ] Vincent Patel, PT 19 1154      Row Name 19 1127             Cognitive Assessment/Intervention- PT/OT    Affect/Mental Status (Cognitive)  WNL  -MJ      Orientation Status (Cognition)  oriented x 4  -MJ      Follows Commands (Cognition)  WNL  -MJ      Recorded by [MJ] Vincent Patel, PT 19 1154      Row Name 19 1127             Safety Issues, Functional Mobility    Impairments Affecting Function (Mobility)  pain;strength  -MJ      Recorded by [MJ] Vincent Patel, PT 19 1154      Row Name  06/20/19 1127             Bed Mobility Assessment/Treatment    Comment (Bed Mobility)  NT- pt UIC  -MJ      Recorded by [MJ] Vincent Patel, PT 06/20/19 1154      Row Name 06/20/19 1127             Transfer Assessment/Treatment    Transfer Assessment/Treatment  sit-stand transfer;stand-sit transfer;toilet transfer  -MJ      Comment (Transfers)  Verbal cues for correct hand placement. Assisted pt to bathroom, cues to drive RW over toilet  -MJ      Recorded by [MJ] Vincent Patel, PT 06/20/19 1154      Row Name 06/20/19 1127             Sit-Stand Transfer    Sit-Stand [place] (Transfers)  supervision;verbal cues  -MJ      Assistive Device (Sit-Stand Transfers)  walker, front-wheeled  -MJ      Recorded by [MJ] Vincent Patel, PT 06/20/19 1154      Row Name 06/20/19 1127             Stand-Sit Transfer    Stand-Sit [place] (Transfers)  supervision;verbal cues  -MJ      Assistive Device (Stand-Sit Transfers)  walker, front-wheeled  -MJ      Recorded by [MJ] Vincent Patel, PT 06/20/19 1154      Row Name 06/20/19 1127             Toilet Transfer    Type (Toilet Transfer)  stand pivot/stand step  -MJ      [place] Level (Toilet Transfer)  supervision;verbal cues  -MJ      Assistive Device (Toilet Transfer)  walker, front-wheeled  -MJ      Recorded by [MJ] Vincent Patel, PT 06/20/19 1154      Row Name 06/20/19 1127             Gait/Stairs Assessment/Training    09501 - Gait Training Minutes   12  -MJ      [place] Level (Gait)  stand by assist;verbal cues  -MJ      Assistive Device (Gait)  walker, front-wheeled  -MJ      Distance in Feet (Gait)  110  -MJ      Pattern (Gait)  step-through  -MJ      Deviations/Abnormal Patterns (Gait)  bilateral deviations;keyana decreased;stride length decreased  -MJ      Bilateral Gait Deviations  heel strike decreased;forward flexed posture  -MJ      Comment (Gait/Stairs)  Pt demo step through gait pattern. Cues for upright posture and to increase LE weight bearing, mild  improvement. Gait limited by fatigue  -MJ      Recorded by [MJ] Vincent Patel, PT 06/20/19 1154      Row Name 06/20/19 1127             Motor Skills Assessment/Interventions    Additional Documentation  Therapeutic Exercise (Group);Therapeutic Exercise Interventions (Group)  -MJ      Recorded by [MJ] Vincent Patel, PT 06/20/19 1154      Row Name 06/20/19 1127             Therapeutic Exercise    87384 - PT Therapeutic Exercise Minutes  8  -MJ      80495 - PT Therapeutic Activity Minutes  4  -MJ      Recorded by [MJ] Vincent Patel, PT 06/20/19 1154      Row Name 06/20/19 1127             Therapeutic Exercise    Lower Extremity (Therapeutic Exercise)  gluteal sets;quad sets, bilateral  -MJ      Lower Extremity Range of Motion (Therapeutic Exercise)  ankle dorsiflexion/plantar flexion, bilateral;hip internal/external rotation, bilateral  -MJ      Core Strength (Therapeutic Exercise)  abdominal bracing  -MJ      Exercise Type (Therapeutic Exercise)  AROM (active range of motion)  -MJ      Position (Therapeutic Exercise)  seated  -MJ      Sets/Reps (Therapeutic Exercise)  10x each  -MJ      Comment (Therapeutic Exercise)  Cues for technique  -MJ      Recorded by [MJ] Vincent Patel, PT 06/20/19 1154      Row Name 06/20/19 1127             Positioning and Restraints    Pre-Treatment Position  sitting in chair/recliner  -MJ      Post Treatment Position  chair  -MJ      In Chair  notified nsg;reclined;call light within reach;encouraged to call for assist  -MJ      Recorded by [MJ] Vincent Patel, PT 06/20/19 1154      Row Name 06/20/19 1127             Pain Scale: Numbers Pre/Post-Treatment    Pain Scale: Numbers, Pretreatment  6/10  -MJ      Pain Scale: Numbers, Post-Treatment  8/10  -MJ      Pain Location - Orientation  lower  -MJ      Pain Location  back  -MJ      Pain Intervention(s)  Repositioned;Ambulation/increased activity;Cold pack  -MJ      Recorded by [MJ] Vincent Patel, PT 06/20/19 1154      Row Name                Wound  06/19/19 1019 Other (See comments) back incision    Wound - Properties Group Date first assessed: 06/19/19 [KS] Time first assessed: 1019 [KS] Side: Other (See comments) [KS] Location: back [KS] Type: incision [KS] Recorded by:  [KS] Maria Alejandra Alatorre RN 06/19/19 1019    Row Name 06/20/19 1127             Plan of Care Review    Plan of Care Reviewed With  patient  -MJ      Recorded by [MJ] Vincent Patel, PT 06/20/19 1154      Row Name 06/20/19 1127             Outcome Summary/Treatment Plan (PT)    Daily Summary of Progress (PT)  progress toward functional goals is gradual  -MJ      Recorded by [MJ] Vincent Patel, PT 06/20/19 1154        User Key  (r) = Recorded By, (t) = Taken By, (c) = Cosigned By    Initials Name Effective Dates Discipline    KS Maria Alejandra Alatorre RN 06/16/16 -  Nurse    Vincent Sotelo, PT 04/03/18 -  PT          Wound 06/19/19 1019 Other (See comments) back incision (Active)   Dressing Appearance dry;intact 6/20/2019 10:05 AM   Closure MEGAN 6/19/2019  8:44 PM   Drainage Amount none 6/20/2019 10:05 AM       Rehab Goal Summary     Row Name 06/20/19 0928             Occupational Therapy Goals    Bed Mobility Goal Selection (OT)  bed mobility, OT goal 1  -HK      Transfer Goal Selection (OT)  transfer, OT goal 1  -HK      Dressing Goal Selection (OT)  dressing, OT goal 1  -HK      Toileting Goal Selection (OT)  toileting, OT goal 1  -HK         Bed Mobility Goal 1 (OT)    Activity/Assistive Device (Bed Mobility Goal 1, OT)  sidelying to sit/sit to sidelying;rolling to left;rolling to right  -HK      [place] Level/Cues Needed (Bed Mobility Goal 1, OT)  minimum assist (75% or more patient effort);verbal cues required  -HK      Time Frame (Bed Mobility Goal 1, OT)  5 days  -HK      Progress/Outcomes (Bed Mobility Goal 1, OT)  goal ongoing  -HK         Transfer Goal 1 (OT)    Activity/Assistive Device (Transfer Goal 1, OT)  sit-to-stand/stand-to-sit;toilet  -HK      [place] Level/Cues Needed (Transfer  Goal 1, OT)  supervision required  -HK      Time Frame (Transfer Goal 1, OT)  5 days  -HK      Progress/Outcome (Transfer Goal 1, OT)  goal ongoing  -HK         Dressing Goal 1 (OT)    Activity/Assistive Device (Dressing Goal 1, OT)  lower body dressing  -HK      [place]/Cues Needed (Dressing Goal 1, OT)  conditional independence  -HK      Time Frame (Dressing Goal 1, OT)  5 days  -HK      Progress/Outcome (Dressing Goal 1, OT)  goal ongoing  -HK         Toileting Goal 1 (OT)    Activity/Device (Toileting Goal 1, OT)  toileting skills, all;toilet paper aid  -HK      [place] Level/Cues Needed (Toileting Goal 1, OT)  minimum assist (75% or more patient effort);verbal cues required  -HK      Time Frame (Toileting Goal 1, OT)  5 days  -HK      Progress/Outcome (Toileting Goal 1, OT)  goal ongoing  -HK        User Key  (r) = Recorded By, (t) = Taken By, (c) = Cosigned By    Initials Name Provider Type Discipline    HK Felicita Rose, OT Occupational Therapist OT          Physical Therapy Education     Title: PT OT SLP Therapies (In Progress)     Topic: Physical Therapy (Done)     Point: Mobility training (Done)     Learning Progress Summary           Patient Acceptance, E,D, VU by  at 6/20/2019 11:27 AM    Comment:  Reviewed HEP, back precautions, gait mechanics, benefits of mobility    Acceptance, E,D,H, VU,NR by  at 6/19/2019  5:37 PM    Comment:  Edu re: back precautions, gait mechanics, benefits of mobility. Issued HEP handout                   Point: Home exercise program (Done)     Learning Progress Summary           Patient Acceptance, E,D, VU by  at 6/20/2019 11:27 AM    Comment:  Reviewed HEP, back precautions, gait mechanics, benefits of mobility                   Point: Body mechanics (Done)     Learning Progress Summary           Patient Acceptance, E,D, VU by  at 6/20/2019 11:27 AM    Comment:  Reviewed HEP, back precautions, gait mechanics, benefits of mobility    Acceptance, E,D,H, VU,NR  by  at 6/19/2019  5:37 PM    Comment:  Edu re: back precautions, gait mechanics, benefits of mobility. Issued HEP handout                   Point: Precautions (Done)     Learning Progress Summary           Patient Acceptance, E,D, VU by  at 6/20/2019 11:27 AM    Comment:  Reviewed HEP, back precautions, gait mechanics, benefits of mobility    Acceptance, E,D,H, VU,NR by  at 6/19/2019  5:37 PM    Comment:  Edu re: back precautions, gait mechanics, benefits of mobility. Issued HEP handout                               User Key     Initials Effective Dates Name Provider Type Discipline     04/03/18 -  Vincent Patel, PT Physical Therapist PT                PT Recommendation and Plan  Anticipated Discharge Disposition (PT): home with assist, home with home health  Planned Therapy Interventions (PT Eval): balance training, bed mobility training, gait training, home exercise program, patient/family education, stair training, strengthening, transfer training  Therapy Frequency (PT Clinical Impression): daily  Outcome Summary/Treatment Plan (PT)  Daily Summary of Progress (PT): progress toward functional goals is gradual  Anticipated Equipment Needs at Discharge (PT): bedside commode  Anticipated Discharge Disposition (PT): home with assist, home with home health  Plan of Care Reviewed With: patient  Progress: improving  Outcome Summary: Pt increased gait distance to 110 feet with SBA and RW, limited by pain. Pt required less assist for functional mobility and recalls back precautions. Will continue to progress mobility as able.  Outcome Measures     Row Name 06/20/19 1127 06/20/19 0928 06/19/19 1737       How much help from another person do you currently need...    Turning from your back to your side while in flat bed without using bedrails?  3  -MJ  --  3  -MJ    Moving from lying on back to sitting on the side of a flat bed without bedrails?  3  -MJ  --  3  -MJ    Moving to and from a bed to a chair (including a  wheelchair)?  3  -MJ  --  3  -MJ    Standing up from a chair using your arms (e.g., wheelchair, bedside chair)?  3  -MJ  --  3  -MJ    Climbing 3-5 steps with a railing?  3  -MJ  --  3  -MJ    To walk in hospital room?  3  -MJ  --  3  -MJ    AM-PAC 6 Clicks Score  18  -MJ  --  18  -MJ       How much help from another is currently needed...    Putting on and taking off regular lower body clothing?  --  3  -HK  --    Bathing (including washing, rinsing, and drying)  --  3  -HK  --    Toileting (which includes using toilet bed pan or urinal)  --  3  -HK  --    Putting on and taking off regular upper body clothing  --  4  -HK  --    Taking care of personal grooming (such as brushing teeth)  --  3  -HK  --    Eating meals  --  4  -HK  --    Score  --  20  -HK  --       Functional Assessment    Outcome Measure Options  AM-PAC 6 Clicks Basic Mobility (PT)  -MJ  AM-PAC 6 Clicks Daily Activity (OT)  -HK  AM-PAC 6 Clicks Basic Mobility (PT)  -MJ      User Key  (r) = Recorded By, (t) = Taken By, (c) = Cosigned By    Initials Name Provider Type    Vincent Sotelo, PT Physical Therapist    HK Felicita Rose, OT Occupational Therapist         Time Calculation:   PT Charges     Row Name 06/20/19 1127             Time Calculation    Start Time  1127  -MJ      PT Received On  06/20/19  -MJ      PT Goal Re-Cert Due Date  06/29/19  -MJ         Time Calculation- PT    Total Timed Code Minutes- PT  24 minute(s)  -         Timed Charges    07404 - PT Therapeutic Exercise Minutes  8  -MJ      06730 - Gait Training Minutes   12  -MJ      93476 - PT Therapeutic Activity Minutes  4  -MJ        User Key  (r) = Recorded By, (t) = Taken By, (c) = Cosigned By    Initials Name Provider Type    Vincent Sotelo, PT Physical Therapist        Therapy Charges for Today     Code Description Service Date Service Provider Modifiers Qty    25870680902 HC PT EVAL LOW COMPLEXITY 3 6/19/2019 Vincent Patel, PT GP 1    93901540622 HC GAIT TRAINING EA 15 MIN  6/19/2019 Vincent Patel, PT GP 1    99026804040 HC PT THER PROC EA 15 MIN 6/20/2019 Vincent Patel, PT GP 1    76633378780 HC GAIT TRAINING EA 15 MIN 6/20/2019 Vincent Patel, PT GP 1          PT G-Codes  Outcome Measure Options: AM-PAC 6 Clicks Basic Mobility (PT)  AM-PAC 6 Clicks Score: 18  Score: 20    Vincent Patel, SE  6/20/2019         Electronically signed by Vincent Patel, PT at 6/20/2019 11:57 AM

## 2019-06-20 NOTE — PLAN OF CARE
Problem: Patient Care Overview  Goal: Plan of Care Review  Outcome: Ongoing (interventions implemented as appropriate)   06/20/19 5253   OTHER   Outcome Summary Pt has rested around nursing care this shift. VSS on room air. Continues to c/o 7/10 pain, ice applied and prn percocet and morphine given per orders. Coverderm dressing to back CDI. Pt is assist x1 with walker. Voiding well. No significant issues overnight, will continue to monitor.    Coping/Psychosocial   Plan of Care Reviewed With patient   Plan of Care Review   Progress no change       Problem: Fall Risk (Adult)  Goal: Identify Related Risk Factors and Signs and Symptoms  Outcome: Ongoing (interventions implemented as appropriate)    Goal: Absence of Fall  Outcome: Ongoing (interventions implemented as appropriate)      Problem: Pain, Chronic (Adult)  Goal: Identify Related Risk Factors and Signs and Symptoms  Outcome: Ongoing (interventions implemented as appropriate)    Goal: Acceptable Pain/Comfort Level and Functional Ability  Outcome: Ongoing (interventions implemented as appropriate)

## 2019-06-20 NOTE — PROGRESS NOTES
"Ortho Spine Progress Note     LOS: 1 day   Patient Care Team:  Malick Cruz DO as PCP - General (Family Medicine)    Subjective Pt awake and alert, up in chair. Has walked in the room. Usual Back Pain, no other complaints.    Objective     Vital Signs:  /83 (BP Location: Right arm, Patient Position: Lying)   Pulse 95   Temp 97.4 °F (36.3 °C) (Oral)   Resp 18   Ht 170.2 cm (67\")   Wt 109 kg (241 lb)   SpO2 96%   BMI 37.75 kg/m²          Labs:  Lab Results (last 24 hours)     Procedure Component Value Units Date/Time    CBC (No Diff) [[id number]]  (Abnormal) Collected:  06/20/19 0544    Specimen:  Blood Updated:  06/20/19 0643     WBC 11.57 10*3/mm3      RBC 3.58 10*6/mm3      Hemoglobin 10.9 g/dL      Hematocrit 34.1 %      MCV 95.3 fL      MCH 30.4 pg      MCHC 32.0 g/dL      RDW 12.9 %      RDW-SD 44.8 fl      MPV 8.7 fL      Platelets 221 10*3/mm3     Basic Metabolic Panel [[id number]]  (Abnormal) Collected:  06/20/19 0544    Specimen:  Blood Updated:  06/20/19 0639     Glucose 158 mg/dL      BUN 14 mg/dL      Creatinine 1.02 mg/dL      Sodium 130 mmol/L      Potassium 5.0 mmol/L      Chloride 96 mmol/L      CO2 21.0 mmol/L      Calcium 7.7 mg/dL      eGFR Non African Amer 77 mL/min/1.73      BUN/Creatinine Ratio 13.7     Anion Gap 13.0 mmol/L     Narrative:       GFR Normal >60  Chronic Kidney Disease <60  Kidney Failure <15          Physical Exam:  Neurovascular intact.  Calves soft, non-tender.      Assessment/Plan   Doing well. Cont working with therapy. Will leave drain in for today. Anticipate home on Fri or Sat.    Andriy Fink MD  06/20/19  7:42 AM      "

## 2019-06-20 NOTE — PROGRESS NOTES
"IM progress note      Mckay Hunt  [id number]  1965     LOS: 1 day     Attending: Milan Valdez MD    Primary Care Provider: Malick Cruz DO      Chief Complaint/Reason for visit:  Back pain    Subjective   Doing well. Adequate pain control. Denies f/c/n/v/sob/cp.  ( improving, ambulaed with PT. No new problems. Voiding)wy    Objective     Vital Signs  Visit Vitals  /83 (BP Location: Right arm, Patient Position: Lying)   Pulse 95   Temp 97.4 °F (36.3 °C) (Oral)   Resp 18   Ht 170.2 cm (67\")   Wt 109 kg (241 lb)   SpO2 96%   BMI 37.75 kg/m²     Temp (24hrs), Av °F (36.7 °C), Min:97.4 °F (36.3 °C), Max:98.6 °F (37 °C)      Nutrition: PO    Respiratory: RA    Physical Therapy: Pt ambulated 90 feet with CGA and RW, limited by pain. PT will follow to increase strength and progress functional mobility with back precautions. Plan is home with HHPT at discharge    Physical Exam:     General Appearance:    Alert, cooperative, in no acute distress   Head:    Normocephalic, without obvious abnormality, atraumatic    Lungs:     Normal effort, symmetric chest rise, no crepitus, clear to      auscultation bilaterally             Heart:    Regular rhythm and normal rate, normal S1 and S2   Abdomen:     Normal bowel sounds, no masses, no organomegaly, soft        non-tender, non-distended, no guarding, no rebound                tenderness   Extremities:   No clubbing, cyanosis or edema.  No deformities.    Pulses:   Pulses palpable and equal bilaterally   Skin:   No bleeding, bruising or rash. Back dressing CDI. HV present   Neurologic:   Moves all extremities with no obvious focal motor deficit.  Cranial nerves 2 - 12 grossly intact. Flexion and dorsiflexion intact bilateral feet.       Results Review:     I reviewed the patient's new clinical results.   Results from last 7 days   Lab Units 19  0544 19  1409   WBC 10*3/mm3 11.57* 9.95   HEMOGLOBIN g/dL 10.9* 15.3   HEMATOCRIT % 34.1* 46.6 "   PLATELETS 10*3/mm3 221 207     Results from last 7 days   Lab Units 06/20/19  0544 06/19/19  0645   SODIUM mmol/L 130*  --    POTASSIUM mmol/L 5.0 4.0   CHLORIDE mmol/L 96*  --    CO2 mmol/L 21.0*  --    BUN mg/dL 14  --    CREATININE mg/dL 1.02  --    CALCIUM mg/dL 7.7*  --    GLUCOSE mg/dL 158*  --      I reviewed the patient's new imaging including images and reports.    All medications reviewed.     allopurinol 300 mg Oral Daily   atorvastatin 10 mg Oral Nightly   cetirizine 10 mg Oral Daily   colchicine 0.6 mg Oral Daily   DULoxetine 60 mg Oral Daily   famotidine 20 mg Intravenous Q12H   Or      famotidine 20 mg Oral Q12H   levothyroxine 50 mcg Oral Q AM   lisinopril 10 mg Oral Daily   metoprolol tartrate 100 mg Oral Q12H   sodium chloride 3 mL Intravenous Q12H       Assessment/Plan     S/P lumbar fusion    Back pain    Hypothyroid    HTN (hypertension)    Leukocytosis, mild, likely reactive    Acute blood loss anemia, mild, asymptomatic    HLD (hyperlipidemia)    Acute postoperative pain    Hyponatremia      Plan  1. PT/OT- ambulate  2. Pain control-prns   3. IS-encouraged  4. DVT proph- [facility]  5. Bowel regimen  6. Monitor post-op labs  7. DC planning for home fri or sat    HTN, HLD  - Continue home lisinopril, lopressor and statin  - Monitor BP   - Holding parameters for BP meds  - Labetalol PRN for SBP>170     Hypothyroid  - Continue home synthroid      RADHA Pereira  06/20/19  11:02 AM     I have personally performed the evaluation on this patient. My history is consistent  with HPI obtained. My exam findings are listed above. I have personally reviewed and discussed the above formulated treatment plan with pt and AH. RADHA.wy.

## 2019-06-20 NOTE — PROGRESS NOTES
Continued Stay Note  [facility]     Patient Name: Mckay Hunt  MRN: [id number]  Today's Date: 6/20/2019    Admit Date: 6/19/2019    Discharge Plan     Row Name 06/20/19 1216       Plan    Plan  Home with family assistance, [facility] and [facility] Medicaid transportation    Patient/Family in Agreement with Plan  yes    Plan Comments  Per CM note yesterday, Mr. Hunt is requesting home health.  Contacted Elo GARCIA and she is OK with home health for patient.  Followed up with Mr. Hunt at the bedside and he requested [facility] in [place], [place].  Called and faxed referral to Rosalee at [facility].  Rosalee stated that she has reviewed patient's information and accepted patient.  Mr. Hunt will likely be discharged tomorrow.  He requires [facility] Medicaid transportation.  Tania, social work, will arrange for [facility] when patient is discharged.  Saturday discharge for [facility] will require early DC orders (by 11am).  Notified Elo GARCIA.    CM will follow up.    Final Discharge Disposition Code  06 - home with home health care        Discharge Codes    No documentation.             Sara Guzmán

## 2019-06-20 NOTE — PLAN OF CARE
Problem: Patient Care Overview  Goal: Plan of Care Review  Outcome: Ongoing (interventions implemented as appropriate)   06/20/19 3918   OTHER   Outcome Summary OT eval complete. Pt completes sit to stand with CGA. OT issued reacher, sock aid, shoe horn, LH sponge and toilet tongs and educated pt on use for completion of LBD, LBB, and toileting. Pt able to dof/don socks with reacher, sock aid, and Yevgeniy. Pt donned shorts with Yevgeniy and use of reacher. Pt will require tub transfer bench before returning home. Reocmmend d/c home with assist and HH.    Coping/Psychosocial   Plan of Care Reviewed With patient   Plan of Care Review   Progress improving

## 2019-06-20 NOTE — THERAPY EVALUATION
Acute Care - Occupational Therapy Initial Evaluation   [facility]     Patient Name: Mckay Hunt  : 1965  MRN: [id number]  Today's Date: 2019  Onset of Illness/Injury or Date of Surgery: 19  Date of Referral to OT: 19  Referring Physician: MD José Miguel     Admit Date: 2019       ICD-10-CM ICD-9-CM   1. Impaired functional mobility, balance, gait, and endurance Z74.09 V49.89   2. S/P lumbar fusion Z98.1 V45.4   3. Impaired mobility and ADLs Z74.09 799.89     Patient Active Problem List   Diagnosis   • Arthritis of left hip   • Hypothyroid   • HTN (hypertension)   • Status post total replacement of left hip   • Leukocytosis, mild, likely reactive   • Acute blood loss anemia, mild, asymptomatic   • Back pain   • S/P lumbar fusion   • HLD (hyperlipidemia)     Past Medical History:   Diagnosis Date   • Abnormal gait    • Anesthesia complication     perks pt up instead of downer,     trouble to put under   • Arthritis    • Cellulitis    • Disease of thyroid gland     hypothyroidism   • Gout    • Hip pain     left   • Hypertension    • Muscle weakness    • Spondylolisthesis    • Tinnitus    • Wears glasses     readers     Past Surgical History:   Procedure Laterality Date   • CARPAL TUNNEL RELEASE      bilat  x2   • ELBOW PROCEDURE      bilat    • JOINT REPLACEMENT Right 2010    HIP   • LUMBAR DISCECTOMY ANTERIOR FUSION INSTRUMENTATION N/A 2019    Procedure: DECOMPRESSION AND FUSION WITH INSTRUMENTATION L4-L5;  Surgeon: Milan Valdez MD;  Location:  [facility];  Service: Orthopedic Spine   • TONSILLECTOMY AND ADENOIDECTOMY      had to go in twice    • TOTAL HIP ARTHROPLASTY Left 2016    Procedure: TOTAL HIP ARTHROPLASTY ANTERIOR LEFT;  Surgeon: Bruce Berg MD;  Location:  [facility];  Service:    • WISDOM TOOTH EXTRACTION      al 4 removed           OT ASSESSMENT FLOWSHEET (last 12 hours)      Occupational Therapy Evaluation     Row Name 19 0928                   OT  Evaluation Time/Intention    Subjective Information  complains of;fatigue;pain  -HK        Document Type  evaluation  -HK        Mode of Treatment  individual therapy;occupational therapy  -HK        Patient Effort  good  -HK        Symptoms Noted During/After Treatment  none  -HK           General Information    Patient Profile Reviewed?  yes  -HK        Onset of Illness/Injury or Date of Surgery  06/19/19  -HK        Referring Physician  MD José Miguel   -HK        Patient Observations  alert;cooperative;agree to therapy  -HK        Patient/Family Observations  No family at bedside.   -HK        General Observations of Patient  Pt received upright in chair with IV heplocked and hemovac intact.   -HK        Prior Level of Function  min assist:;community mobility;all household mobility;gait;transfer;bed mobility;ADL's;cooking;cleaning;driving;shopping pain with all activity  -HK        Equipment Currently Used at Home  rollator;cane, straight  -HK        Pertinent History of Current Functional Problem  Pt is a 53 YOM who presents to [facility] for surgical management of intractable back pain and dysfunction that failed to improve with conservative management. PT is POD #1 s/p lumbar fusion.   -HK        Existing Precautions/Restrictions  fall;spinal;other (see comments) hemovac   -HK        Equipment Issued to Patient  long handled sponge;sock aid;toilet aid;reacher;other (see comments) shoe horn   -HK        Risks Reviewed  patient:;LOB;nausea/vomiting;dizziness;increased discomfort;change in vital signs;increased drainage;lines disloged  -HK        Benefits Reviewed  patient:;increase independence;improve function;increase balance;increase strength;decrease pain;decrease risk of DVT;improve skin integrity;increase knowledge  -HK        Barriers to Rehab  previous functional deficit  -HK           Relationship/Environment    Primary Source of Support/Comfort  child(herberth)  -HK        Lives With  alone  -HK            Resource/Environmental Concerns    Current Living Arrangements  home/apartment/condo  -HK           Home Main Entrance    Number of Stairs, Main Entrance  two  -HK        Stair Railings, Main Entrance  railings on both sides of stairs  -HK           Cognitive Assessment/Interventions    Additional Documentation  Cognitive Assessment/Intervention (Group)  -HK           Cognitive Assessment/Intervention- PT/OT    Affect/Mental Status (Cognitive)  WFL  -HK        Orientation Status (Cognition)  oriented x 4  -HK        Follows Commands (Cognition)  follows one step commands;over 90% accuracy;repetition of directions required;verbal cues/prompting required  -HK        Cognitive Function (Cognitive)  safety deficit;attention deficit  -HK        Attention Deficit (Cognitive)  mild deficit;focused/sustained attention;requires cues/redirection to task  -HK        Safety Deficit (Cognitive)  mild deficit;safety precautions awareness;safety precautions follow-through/compliance;insight into deficits/self awareness;awareness of need for assistance  -HK        Personal Safety Interventions  fall prevention program maintained;gait belt;nonskid shoes/slippers when out of bed  -HK           Safety Issues, Functional Mobility    Safety Issues Affecting Function (Mobility)  safety precautions follow-through/compliance;safety precaution awareness  -HK        Impairments Affecting Function (Mobility)  balance;pain;strength  -HK           Bed Mobility Assessment/Treatment    Comment (Bed Mobility)  Pt received and left UIC. OT educated pt on completion of bed mobility using log roll technique to maintain spinal precautions.   -HK           Functional Mobility    Functional Mobility- Ind. Level  not tested  -HK           Transfer Assessment/Treatment    Transfer Assessment/Treatment  sit-stand transfer;stand-sit transfer  -HK        Comment (Transfers)  Verbal cues for safe hand placement and sequencing. Pt refused to use RW despite  cues and RW being in front of him. Pt educated on safe car transfers.   -HK           Sit-Stand Transfer    Sit-Stand [place] (Transfers)  contact guard;verbal cues  -HK        Assistive Device (Sit-Stand Transfers)  -- none  -HK           Stand-Sit Transfer    Stand-Sit [place] (Transfers)  contact guard;verbal cues  -HK        Assistive Device (Stand-Sit Transfers)  -- none  -HK           ADL Assessment/Intervention    BADL Assessment/Intervention  lower body dressing;toileting;bathing  -HK           Bathing Assessment/Intervention    Comment (Bathing)  OT issued LH sponge and educated pt on use for completion of LBB while maintaining spinal precautions.   -HK           Lower Body Dressing Assessment/Training    Lower Body Dressing [place] Level  doff;don;socks;pants/bottoms;minimum assist (75% patient effort);verbal cues  -HK        Assistive Devices (Lower Body Dressing)  sock-aid;reacher  -HK        Lower Body Dressing Position  unsupported sitting  -HK        Comment (Lower Body Dressing)  OT issued reacher, sock aid, and shoe horn and educated pt on use. Pt dof/don socks with Yevgeniy and use of reacher and sock aid. Pt donned shorts with Yevgeniy and use of reacher.   -HK           Toileting Assessment/Training    Comment (Toileting)  OT issued extra long toilet aid and educated pt on use for completion of terrance care while maintaing spinal precautions.   -HK           BADL Safety/Performance    Impairments, BADL Safety/Performance  balance;pain;strength  -HK           General ROM    RT Upper Ext  Rt Shoulder Flexion  -HK        LT Upper Ext  Lt Shoulder Flexion  -HK           Right Upper Ext    Rt Shoulder Flexion AROM  grossly 140 degrees AROM  -HK           Left Upper Ext    Lt Shoulder Flexion AROM  grossly 140 degrees AROM  -HK           MMT (Manual Muscle Testing)    Rt Upper Ext  Rt Shoulder WFL  -HK        Lt Upper Ext  Lt Shoulder WFL  -HK        General MMT Comments  WFL for eval   -HK            Motor Assessment/Interventions    Additional Documentation  Balance (Group)  -HK           Balance    Balance  static sitting balance;static standing balance;dynamic sitting balance;dynamic standing balance  -HK           Static Sitting Balance    Level of [place] (Unsupported Sitting, Static Balance)  independent  -HK        Sitting Position (Unsupported Sitting, Static Balance)  sitting in chair  -HK        Time Able to Maintain Position (Unsupported Sitting, Static Balance)  more than 5 minutes  -HK           Dynamic Sitting Balance    Level of [place], Reaches Outside Midline (Sitting, Dynamic Balance)  independent  -HK        Sitting Position, Reaches Outside Midline (Sitting, Dynamic Balance)  sitting in chair  -HK        Comment, Reaches Outside Midline (Sitting, Dynamic Balance)  completing LBD with use of AE   -HK           Static Standing Balance    Level of [place] (Supported Standing, Static Balance)  supervision  -HK        Time Able to Maintain Position (Supported Standing, Static Balance)  1 to 2 minutes  -HK        Assistive Device Utilized (Supported Standing, Static Balance)  --  -HK           Dynamic Standing Balance    Level of [place], Reaches Outside Midline (Standing, Dynamic Balance)  supervision  -HK        Time Able to Maintain Position, Reaches Outside Midline (Standing, Dynamic Balance)  15 to 30 seconds  -HK        Comment, Reaches Outside Midline (Standing, Dynamic Balance)  pulling shorts over hips   -HK           Sensory Assessment/Intervention    Sensory General Assessment  no sensation deficits identified  -HK           Positioning and Restraints    Pre-Treatment Position  sitting in chair/recliner  -HK        Post Treatment Position  chair  -HK        In Chair  notified nsg;reclined;call light within reach;encouraged to call for assist  -HK           Pain Assessment    Additional Documentation  Pain Scale: Numbers Pre/Post-Treatment (Group)  -HK            Pain Scale: Numbers Pre/Post-Treatment    Pain Scale: Numbers, Pretreatment  8/10  -HK        Pain Scale: Numbers, Post-Treatment  8/10  -HK        Pain Location - Side  Bilateral  -HK        Pain Location - Orientation  lower  -HK        Pain Location  back  -HK        Pain Intervention(s)  Repositioned;Ambulation/increased activity  -HK           Wound 06/19/19 1019 Other (See comments) back incision    Wound - Properties Group Date first assessed: 06/19/19  -KS Time first assessed: 1019  -KS Side: Other (See comments)  -KS Location: back  -KS Type: incision  -KS       Coping    Observed Emotional State  accepting;calm;cooperative  -HK           Plan of Care Review    Plan of Care Reviewed With  patient  -HK           Clinical Impression (OT)    Date of Referral to OT  06/19/19  -HK        OT Diagnosis  Decreased independence with ADLS and Mobility.  -HK        Patient/Family Goals Statement (OT Eval)  Pt would like to improve and return home.   -HK        Criteria for Skilled Therapeutic Interventions Met (OT Eval)  yes;treatment indicated  -HK        Rehab Potential (OT Eval)  good, to achieve stated therapy goals  -HK        Therapy Frequency (OT Eval)  daily  -HK        Care Plan Review (OT)  evaluation/treatment results reviewed;care plan/treatment goals reviewed;risks/benefits reviewed;patient/other agree to care plan  -HK        Anticipated Equipment Needs at Discharge (OT)  tub bench  -HK        Anticipated Discharge Disposition (OT)  home with home health;home with assist  -HK           Vital Signs    Pre Systolic BP Rehab  -- RN cleared for tx   -HK        Pre Patient Position  Sitting  -HK        Intra Patient Position  Standing  -HK        Post Patient Position  Sitting  -HK           OT Goals    Bed Mobility Goal Selection (OT)  bed mobility, OT goal 1  -HK        Transfer Goal Selection (OT)  transfer, OT goal 1  -HK        Dressing Goal Selection (OT)  dressing, OT goal 1  -HK        Toileting Goal  Selection (OT)  toileting, OT goal 1  -HK           Bed Mobility Goal 1 (OT)    Activity/Assistive Device (Bed Mobility Goal 1, OT)  sidelying to sit/sit to sidelying;rolling to left;rolling to right  -HK        Branch Level/Cues Needed (Bed Mobility Goal 1, OT)  minimum assist (75% or more patient effort);verbal cues required  -HK        Time Frame (Bed Mobility Goal 1, OT)  5 days  -HK        Progress/Outcomes (Bed Mobility Goal 1, OT)  goal ongoing  -HK           Transfer Goal 1 (OT)    Activity/Assistive Device (Transfer Goal 1, OT)  sit-to-stand/stand-to-sit;toilet  -HK        Branch Level/Cues Needed (Transfer Goal 1, OT)  supervision required  -HK        Time Frame (Transfer Goal 1, OT)  5 days  -HK        Progress/Outcome (Transfer Goal 1, OT)  goal ongoing  -HK           Dressing Goal 1 (OT)    Activity/Assistive Device (Dressing Goal 1, OT)  lower body dressing  -HK        Branch/Cues Needed (Dressing Goal 1, OT)  conditional independence  -HK        Time Frame (Dressing Goal 1, OT)  5 days  -HK        Progress/Outcome (Dressing Goal 1, OT)  goal ongoing  -HK           Toileting Goal 1 (OT)    Activity/Device (Toileting Goal 1, OT)  toileting skills, all;toilet paper aid  -HK        Branch Level/Cues Needed (Toileting Goal 1, OT)  minimum assist (75% or more patient effort);verbal cues required  -HK        Time Frame (Toileting Goal 1, OT)  5 days  -HK        Progress/Outcome (Toileting Goal 1, OT)  goal ongoing  -HK           Living Environment    Home Accessibility  stairs to enter home;tub/shower is not walk in  -HK          User Key  (r) = Recorded By, (t) = Taken By, (c) = Cosigned By    Initials Name Effective Dates    Maria Alejandra Mitchell RN 06/16/16 -     HK Felicita Rose OT 03/07/18 -          Occupational Therapy Education     Title: PT OT SLP Therapies (In Progress)     Topic: Occupational Therapy (In Progress)     Point: ADL training (Done)     Description: Instruct  learner(s) on proper safety adaptation and remediation techniques during self care or transfers.   Instruct in proper use of assistive devices.    Learning Progress Summary           Patient Acceptance, E,TB,D,H, VU,DU by  at 6/20/2019  9:28 AM    Comment:  Pt educated on ADL retraining with use of AE for completion of LBD, LBB, and toileting. Educated on safety precautions and appropriate body mechanics to maintain spinal precautions.                   Point: Precautions (Done)     Description: Instruct learner(s) on prescribed precautions during self-care and functional transfers.    Learning Progress Summary           Patient Acceptance, E,TB,D,H, VU,DU by  at 6/20/2019  9:28 AM    Comment:  Pt educated on ADL retraining with use of AE for completion of LBD, LBB, and toileting. Educated on safety precautions and appropriate body mechanics to maintain spinal precautions.                   Point: Body mechanics (Done)     Description: Instruct learner(s) on proper positioning and spine alignment during self-care, functional mobility activities and/or exercises.    Learning Progress Summary           Patient Acceptance, E,TB,D,H, VU,DU by  at 6/20/2019  9:28 AM    Comment:  Pt educated on ADL retraining with use of AE for completion of LBD, LBB, and toileting. Educated on safety precautions and appropriate body mechanics to maintain spinal precautions.                               User Key     Initials Effective Dates Name Provider Type Discipline     03/07/18 -  Felicita Rose, OT Occupational Therapist OT                  OT Recommendation and Plan  Outcome Summary/Treatment Plan (OT)  Anticipated Equipment Needs at Discharge (OT): tub bench  Anticipated Discharge Disposition (OT): home with home health, home with assist  Therapy Frequency (OT Eval): daily  Plan of Care Review  Plan of Care Reviewed With: patient  Plan of Care Reviewed With: patient  Outcome Summary: OT eval complete. Pt completes sit to  stand with CGA. OT issued reacher, sock aid, shoe horn, LH sponge and toilet tongs and educated pt on use for completion of LBD, LBB, and toileting. Pt able to dof/don socks with reacher, sock aid, and Yevgeniy. Pt donned shorts with Yevgeniy and use of reacher. Pt will require tub transfer bench before returning home. Reocmmend d/c home with assist and HH.     Outcome Measures     Row Name 06/20/19 0928 06/19/19 3227          How much help from another person do you currently need...    Turning from your back to your side while in flat bed without using bedrails?  --  3  -MJ     Moving from lying on back to sitting on the side of a flat bed without bedrails?  --  3  -MJ     Moving to and from a bed to a chair (including a wheelchair)?  --  3  -MJ     Standing up from a chair using your arms (e.g., wheelchair, bedside chair)?  --  3  -MJ     Climbing 3-5 steps with a railing?  --  3  -MJ     To walk in hospital room?  --  3  -MJ     AM-PAC 6 Clicks Score  --  18  -MJ        How much help from another is currently needed...    Putting on and taking off regular lower body clothing?  3  -HK  --     Bathing (including washing, rinsing, and drying)  3  -HK  --     Toileting (which includes using toilet bed pan or urinal)  3  -HK  --     Putting on and taking off regular upper body clothing  4  -HK  --     Taking care of personal grooming (such as brushing teeth)  3  -HK  --     Eating meals  4  -HK  --     Score  20  -HK  --        Functional Assessment    Outcome Measure Options  AM-PAC 6 Clicks Daily Activity (OT)  -  AM-PAC 6 Clicks Basic Mobility (PT)  -       User Key  (r) = Recorded By, (t) = Taken By, (c) = Cosigned By    Initials Name Provider Type    Vincent Sotelo, PT Physical Therapist    Felicita Lama, OT Occupational Therapist          Time Calculation:   Time Calculation- OT     Row Name 06/20/19 0928             Time Calculation- OT    OT Start Time  0928  -      OT Received On  06/20/19  -      OT  Goal Re-Cert Due Date  06/30/19  -         Timed Charges    48299 - OT Self Care/Mgmt Minutes  26  -HK        User Key  (r) = Recorded By, (t) = Taken By, (c) = Cosigned By    Initials Name Provider Type     Felicita Rose, JAKUB Occupational Therapist        Therapy Charges for Today     Code Description Service Date Service Provider Modifiers Qty    54824257140 HC OT SELF CARE/MGMT/TRAIN EA 15 MIN 6/20/2019 Felicita Rose, OT GO 2    75025207431 HC OT EVAL LOW COMPLEXITY 2 6/20/2019 Felicita Rose, OT GO 1    21865878466 HC OT THER SUPP EA 15 MIN 6/20/2019 Felicita Rose, OT GO 1               Felicita Rose OT  6/20/2019

## 2019-06-20 NOTE — THERAPY TREATMENT NOTE
Acute Care - Physical Therapy Treatment Note  [facility]     Patient Name: Mckay Hunt  : 1965  MRN: [id number]  Today's Date: 2019  Onset of Illness/Injury or Date of Surgery: 19  Date of Referral to PT: 19  Referring Physician: MD José Miguel     Admit Date: 2019    Visit Dx:    ICD-10-CM ICD-9-CM   1. Impaired functional mobility, balance, gait, and endurance Z74.09 V49.89   2. S/P lumbar fusion Z98.1 V45.4   3. Impaired mobility and ADLs Z74.09 799.89     Patient Active Problem List   Diagnosis   • Arthritis of left hip   • Hypothyroid   • HTN (hypertension)   • Status post total replacement of left hip   • Leukocytosis, mild, likely reactive   • Acute blood loss anemia, mild, asymptomatic   • Back pain   • S/P lumbar fusion   • HLD (hyperlipidemia)   • Acute postoperative pain   • Hyponatremia       Therapy Treatment    Rehabilitation Treatment Summary     Row Name 19 1127             Treatment Time/Intention    Discipline  physical therapist  -      Document Type  therapy note (daily note)  -      Subjective Information  complains of;pain  -MJ      Mode of Treatment  physical therapy  -MJ      Patient/Family Observations  Pt sitting UIC  -      Care Plan Review  patient/other agree to care plan  -MJ      Patient Effort  good  -MJ      Existing Precautions/Restrictions  fall;spinal;other (see comments) hemovac  -MJ      Recorded by [MJ] Vincent Patel, PT 19 1154      Row Name 19 1127             Cognitive Assessment/Intervention- PT/OT    Affect/Mental Status (Cognitive)  WNL  -MJ      Orientation Status (Cognition)  oriented x 4  -MJ      Follows Commands (Cognition)  WNL  -MJ      Recorded by [MJ] Vincent Patel, PT 19 1154      Row Name 19 1127             Safety Issues, Functional Mobility    Impairments Affecting Function (Mobility)  pain;strength  -MJ      Recorded by [MJ] Vincent Patel, PT 19 1154      Row Name 19 1127              Bed Mobility Assessment/Treatment    Comment (Bed Mobility)  NT- pt UIC  -MJ      Recorded by [MJ] Vincent Patel, PT 06/20/19 1154      Row Name 06/20/19 1127             Transfer Assessment/Treatment    Transfer Assessment/Treatment  sit-stand transfer;stand-sit transfer;toilet transfer  -MJ      Comment (Transfers)  Verbal cues for correct hand placement. Assisted pt to bathroom, cues to drive RW over toilet  -MJ      Recorded by [MJ] Vincent Patel, PT 06/20/19 1154      Row Name 06/20/19 1127             Sit-Stand Transfer    Sit-Stand [place] (Transfers)  supervision;verbal cues  -MJ      Assistive Device (Sit-Stand Transfers)  walker, front-wheeled  -MJ      Recorded by [MJ] Vincent Patel, PT 06/20/19 1154      Row Name 06/20/19 1127             Stand-Sit Transfer    Stand-Sit [place] (Transfers)  supervision;verbal cues  -MJ      Assistive Device (Stand-Sit Transfers)  walker, front-wheeled  -MJ      Recorded by [MJ] Vincent Patel, PT 06/20/19 1154      Row Name 06/20/19 1127             Toilet Transfer    Type (Toilet Transfer)  stand pivot/stand step  -MJ      [place] Level (Toilet Transfer)  supervision;verbal cues  -MJ      Assistive Device (Toilet Transfer)  walker, front-wheeled  -MJ      Recorded by [MJ] Vincent Patel, PT 06/20/19 1154      Row Name 06/20/19 1127             Gait/Stairs Assessment/Training    88891 - Gait Training Minutes   12  -MJ      [place] Level (Gait)  stand by assist;verbal cues  -MJ      Assistive Device (Gait)  walker, front-wheeled  -MJ      Distance in Feet (Gait)  110  -MJ      Pattern (Gait)  step-through  -MJ      Deviations/Abnormal Patterns (Gait)  bilateral deviations;keyana decreased;stride length decreased  -MJ      Bilateral Gait Deviations  heel strike decreased;forward flexed posture  -MJ      Comment (Gait/Stairs)  Pt demo step through gait pattern. Cues for upright posture and to increase LE weight bearing, mild improvement. Gait limited by  fatigue  -MJ      Recorded by [MJ] Vincent Patel, PT 06/20/19 1154      Row Name 06/20/19 1127             Motor Skills Assessment/Interventions    Additional Documentation  Therapeutic Exercise (Group);Therapeutic Exercise Interventions (Group)  -MJ      Recorded by [MJ] Vincent Patel, PT 06/20/19 1154      Row Name 06/20/19 1127             Therapeutic Exercise    92005 - PT Therapeutic Exercise Minutes  8  -MJ      89326 - PT Therapeutic Activity Minutes  4  -MJ      Recorded by [MJ] Vincent Patel, PT 06/20/19 1154      Row Name 06/20/19 1127             Therapeutic Exercise    Lower Extremity (Therapeutic Exercise)  gluteal sets;quad sets, bilateral  -MJ      Lower Extremity Range of Motion (Therapeutic Exercise)  ankle dorsiflexion/plantar flexion, bilateral;hip internal/external rotation, bilateral  -MJ      Core Strength (Therapeutic Exercise)  abdominal bracing  -MJ      Exercise Type (Therapeutic Exercise)  AROM (active range of motion)  -MJ      Position (Therapeutic Exercise)  seated  -MJ      Sets/Reps (Therapeutic Exercise)  10x each  -MJ      Comment (Therapeutic Exercise)  Cues for technique  -MJ      Recorded by [MJ] Vincent Patel, PT 06/20/19 1154      Row Name 06/20/19 1127             Positioning and Restraints    Pre-Treatment Position  sitting in chair/recliner  -MJ      Post Treatment Position  chair  -MJ      In Chair  notified nsg;reclined;call light within reach;encouraged to call for assist  -MJ      Recorded by [MJ] Vincent Patel, PT 06/20/19 1154      Row Name 06/20/19 1127             Pain Scale: Numbers Pre/Post-Treatment    Pain Scale: Numbers, Pretreatment  6/10  -MJ      Pain Scale: Numbers, Post-Treatment  8/10  -MJ      Pain Location - Orientation  lower  -MJ      Pain Location  back  -MJ      Pain Intervention(s)  Repositioned;Ambulation/increased activity;Cold pack  -MJ      Recorded by [MJ] Vincent Patel, PT 06/20/19 1154      Row Name                Wound 06/19/19 1019 Other (See  comments) back incision    Wound - Properties Group Date first assessed: 06/19/19 [KS] Time first assessed: 1019 [KS] Side: Other (See comments) [KS] Location: back [KS] Type: incision [KS] Recorded by:  [KS] Maria Alejandra Alatorre RN 06/19/19 1019    Row Name 06/20/19 1127             Plan of Care Review    Plan of Care Reviewed With  patient  -MJ      Recorded by [MJ] Vincent Patel, PT 06/20/19 1154      Row Name 06/20/19 1127             Outcome Summary/Treatment Plan (PT)    Daily Summary of Progress (PT)  progress toward functional goals is gradual  -MJ      Recorded by [MJ] Vincent Patel, PT 06/20/19 1154        User Key  (r) = Recorded By, (t) = Taken By, (c) = Cosigned By    Initials Name Effective Dates Discipline    KS Maria Alejandra Alatorre RN 06/16/16 -  Nurse    Vincent Sotelo, PT 04/03/18 -  PT          Wound 06/19/19 1019 Other (See comments) back incision (Active)   Dressing Appearance dry;intact 6/20/2019 10:05 AM   Closure MEGAN 6/19/2019  8:44 PM   Drainage Amount none 6/20/2019 10:05 AM       Rehab Goal Summary     Row Name 06/20/19 0928             Occupational Therapy Goals    Bed Mobility Goal Selection (OT)  bed mobility, OT goal 1  -HK      Transfer Goal Selection (OT)  transfer, OT goal 1  -HK      Dressing Goal Selection (OT)  dressing, OT goal 1  -HK      Toileting Goal Selection (OT)  toileting, OT goal 1  -HK         Bed Mobility Goal 1 (OT)    Activity/Assistive Device (Bed Mobility Goal 1, OT)  sidelying to sit/sit to sidelying;rolling to left;rolling to right  -HK      [place] Level/Cues Needed (Bed Mobility Goal 1, OT)  minimum assist (75% or more patient effort);verbal cues required  -HK      Time Frame (Bed Mobility Goal 1, OT)  5 days  -HK      Progress/Outcomes (Bed Mobility Goal 1, OT)  goal ongoing  -HK         Transfer Goal 1 (OT)    Activity/Assistive Device (Transfer Goal 1, OT)  sit-to-stand/stand-to-sit;toilet  -HK      [place] Level/Cues Needed (Transfer Goal 1, OT)  supervision  required  -HK      Time Frame (Transfer Goal 1, OT)  5 days  -HK      Progress/Outcome (Transfer Goal 1, OT)  goal ongoing  -HK         Dressing Goal 1 (OT)    Activity/Assistive Device (Dressing Goal 1, OT)  lower body dressing  -HK      [place]/Cues Needed (Dressing Goal 1, OT)  conditional independence  -HK      Time Frame (Dressing Goal 1, OT)  5 days  -HK      Progress/Outcome (Dressing Goal 1, OT)  goal ongoing  -HK         Toileting Goal 1 (OT)    Activity/Device (Toileting Goal 1, OT)  toileting skills, all;toilet paper aid  -HK      [place] Level/Cues Needed (Toileting Goal 1, OT)  minimum assist (75% or more patient effort);verbal cues required  -HK      Time Frame (Toileting Goal 1, OT)  5 days  -HK      Progress/Outcome (Toileting Goal 1, OT)  goal ongoing  -HK        User Key  (r) = Recorded By, (t) = Taken By, (c) = Cosigned By    Initials Name Provider Type Discipline    HK Felicita Rose, OT Occupational Therapist OT          Physical Therapy Education     Title: PT OT SLP Therapies (In Progress)     Topic: Physical Therapy (Done)     Point: Mobility training (Done)     Learning Progress Summary           Patient Acceptance, E,D, VU by  at 6/20/2019 11:27 AM    Comment:  Reviewed HEP, back precautions, gait mechanics, benefits of mobility    Acceptance, E,D,H, VU,NR by  at 6/19/2019  5:37 PM    Comment:  Edu re: back precautions, gait mechanics, benefits of mobility. Issued HEP handout                   Point: Home exercise program (Done)     Learning Progress Summary           Patient Acceptance, E,D, VU by  at 6/20/2019 11:27 AM    Comment:  Reviewed HEP, back precautions, gait mechanics, benefits of mobility                   Point: Body mechanics (Done)     Learning Progress Summary           Patient Acceptance, E,D, VU by  at 6/20/2019 11:27 AM    Comment:  Reviewed HEP, back precautions, gait mechanics, benefits of mobility    Acceptance, E,D,H, VU,NR by  at 6/19/2019  5:37  PM    Comment:  Edu re: back precautions, gait mechanics, benefits of mobility. Issued HEP handout                   Point: Precautions (Done)     Learning Progress Summary           Patient Acceptance, E,D, VU by  at 6/20/2019 11:27 AM    Comment:  Reviewed HEP, back precautions, gait mechanics, benefits of mobility    Acceptance, E,D,H, VU,NR by  at 6/19/2019  5:37 PM    Comment:  Edu re: back precautions, gait mechanics, benefits of mobility. Issued HEP handout                               User Key     Initials Effective Dates Name Provider Type Discipline     04/03/18 -  Vincent Patel, PT Physical Therapist PT                PT Recommendation and Plan  Anticipated Discharge Disposition (PT): home with assist, home with home health  Planned Therapy Interventions (PT Eval): balance training, bed mobility training, gait training, home exercise program, patient/family education, stair training, strengthening, transfer training  Therapy Frequency (PT Clinical Impression): daily  Outcome Summary/Treatment Plan (PT)  Daily Summary of Progress (PT): progress toward functional goals is gradual  Anticipated Equipment Needs at Discharge (PT): bedside commode  Anticipated Discharge Disposition (PT): home with assist, home with home health  Plan of Care Reviewed With: patient  Progress: improving  Outcome Summary: Pt increased gait distance to 110 feet with SBA and RW, limited by pain. Pt required less assist for functional mobility and recalls back precautions. Will continue to progress mobility as able.  Outcome Measures     Row Name 06/20/19 1127 06/20/19 0928 06/19/19 1737       How much help from another person do you currently need...    Turning from your back to your side while in flat bed without using bedrails?  3  -MJ  --  3  -MJ    Moving from lying on back to sitting on the side of a flat bed without bedrails?  3  -MJ  --  3  -MJ    Moving to and from a bed to a chair (including a wheelchair)?  3  -MJ  --   3  -MJ    Standing up from a chair using your arms (e.g., wheelchair, bedside chair)?  3  -MJ  --  3  -MJ    Climbing 3-5 steps with a railing?  3  -MJ  --  3  -MJ    To walk in hospital room?  3  -MJ  --  3  -MJ    AM-PAC 6 Clicks Score  18  -MJ  --  18  -MJ       How much help from another is currently needed...    Putting on and taking off regular lower body clothing?  --  3  -HK  --    Bathing (including washing, rinsing, and drying)  --  3  -HK  --    Toileting (which includes using toilet bed pan or urinal)  --  3  -HK  --    Putting on and taking off regular upper body clothing  --  4  -HK  --    Taking care of personal grooming (such as brushing teeth)  --  3  -HK  --    Eating meals  --  4  -HK  --    Score  --  20  -HK  --       Functional Assessment    Outcome Measure Options  AM-PAC 6 Clicks Basic Mobility (PT)  -MJ  AM-PAC 6 Clicks Daily Activity (OT)  -HK  AM-PAC 6 Clicks Basic Mobility (PT)  -MJ      User Key  (r) = Recorded By, (t) = Taken By, (c) = Cosigned By    Initials Name Provider Type    Vincent Sotelo, PT Physical Therapist    HK Felicita Rose, OT Occupational Therapist         Time Calculation:   PT Charges     Row Name 06/20/19 1127             Time Calculation    Start Time  1127  -MJ      PT Received On  06/20/19  -MJ      PT Goal Re-Cert Due Date  06/29/19  -MJ         Time Calculation- PT    Total Timed Code Minutes- PT  24 minute(s)  -MJ         Timed Charges    86813 - PT Therapeutic Exercise Minutes  8  -MJ      98302 - Gait Training Minutes   12  -MJ      07287 - PT Therapeutic Activity Minutes  4  -MJ        User Key  (r) = Recorded By, (t) = Taken By, (c) = Cosigned By    Initials Name Provider Type    Vincent Sotelo, PT Physical Therapist        Therapy Charges for Today     Code Description Service Date Service Provider Modifiers Qty    87127215419 HC PT EVAL LOW COMPLEXITY 3 6/19/2019 Vincent Patel, PT GP 1    43303569144 HC GAIT TRAINING EA 15 MIN 6/19/2019 Vincent Patel, PT  GP 1    75115091486 HC PT THER PROC EA 15 MIN 6/20/2019 Vincent Patel, PT GP 1    09352114511 HC GAIT TRAINING EA 15 MIN 6/20/2019 Vincent Patel, PT GP 1          PT G-Codes  Outcome Measure Options: AM-PAC 6 Clicks Basic Mobility (PT)  AM-PAC 6 Clicks Score: 18  Score: 20    Vincent Patel, SE  6/20/2019

## 2019-06-21 PROCEDURE — 97116 GAIT TRAINING THERAPY: CPT

## 2019-06-21 PROCEDURE — 97110 THERAPEUTIC EXERCISES: CPT

## 2019-06-21 PROCEDURE — 25010000002 MORPHINE PER 10 MG: Performed by: ORTHOPAEDIC SURGERY

## 2019-06-21 RX ADMIN — CYCLOBENZAPRINE HYDROCHLORIDE 10 MG: 10 TABLET, FILM COATED ORAL at 18:16

## 2019-06-21 RX ADMIN — OXYCODONE HYDROCHLORIDE AND ACETAMINOPHEN 1 TABLET: 10; 325 TABLET ORAL at 09:26

## 2019-06-21 RX ADMIN — ATORVASTATIN CALCIUM 10 MG: 10 TABLET, FILM COATED ORAL at 21:19

## 2019-06-21 RX ADMIN — FAMOTIDINE 20 MG: 20 TABLET ORAL at 21:19

## 2019-06-21 RX ADMIN — FAMOTIDINE 20 MG: 20 TABLET ORAL at 09:25

## 2019-06-21 RX ADMIN — SODIUM CHLORIDE, PRESERVATIVE FREE 3 ML: 5 INJECTION INTRAVENOUS at 21:21

## 2019-06-21 RX ADMIN — COLCHICINE 0.6 MG: 0.6 TABLET, FILM COATED ORAL at 09:26

## 2019-06-21 RX ADMIN — DULOXETINE HYDROCHLORIDE 60 MG: 60 CAPSULE, DELAYED RELEASE ORAL at 09:26

## 2019-06-21 RX ADMIN — CYCLOBENZAPRINE HYDROCHLORIDE 10 MG: 10 TABLET, FILM COATED ORAL at 09:26

## 2019-06-21 RX ADMIN — OXYCODONE HYDROCHLORIDE AND ACETAMINOPHEN 1 TABLET: 10; 325 TABLET ORAL at 14:46

## 2019-06-21 RX ADMIN — MORPHINE SULFATE 1 MG: 2 INJECTION, SOLUTION INTRAMUSCULAR; INTRAVENOUS at 10:52

## 2019-06-21 RX ADMIN — METOPROLOL TARTRATE 100 MG: 100 TABLET ORAL at 21:19

## 2019-06-21 RX ADMIN — MORPHINE SULFATE 1 MG: 2 INJECTION, SOLUTION INTRAMUSCULAR; INTRAVENOUS at 05:06

## 2019-06-21 RX ADMIN — LEVOTHYROXINE SODIUM 50 MCG: 50 TABLET ORAL at 05:06

## 2019-06-21 RX ADMIN — OXYCODONE HYDROCHLORIDE AND ACETAMINOPHEN 1 TABLET: 10; 325 TABLET ORAL at 00:20

## 2019-06-21 RX ADMIN — MORPHINE SULFATE 1 MG: 2 INJECTION, SOLUTION INTRAMUSCULAR; INTRAVENOUS at 19:57

## 2019-06-21 RX ADMIN — CETIRIZINE HYDROCHLORIDE 10 MG: 10 TABLET, FILM COATED ORAL at 09:25

## 2019-06-21 RX ADMIN — OXYCODONE HYDROCHLORIDE AND ACETAMINOPHEN 1 TABLET: 10; 325 TABLET ORAL at 22:11

## 2019-06-21 RX ADMIN — SODIUM CHLORIDE, PRESERVATIVE FREE 3 ML: 5 INJECTION INTRAVENOUS at 09:27

## 2019-06-21 RX ADMIN — OXYCODONE HYDROCHLORIDE AND ACETAMINOPHEN 1 TABLET: 10; 325 TABLET ORAL at 04:15

## 2019-06-21 RX ADMIN — OXYCODONE HYDROCHLORIDE AND ACETAMINOPHEN 1 TABLET: 10; 325 TABLET ORAL at 18:16

## 2019-06-21 RX ADMIN — ALLOPURINOL 300 MG: 300 TABLET ORAL at 09:25

## 2019-06-21 NOTE — PLAN OF CARE
Problem: Patient Care Overview  Goal: Plan of Care Review   06/21/19 8344   OTHER   Outcome Summary Pt calm and cooperative. Lumbar dressing changed, hemovac discontinued. Pt has been up to chair, ambulating with standby assist to BR and ambulated in [place] with PT. Plans to DC in am, VSS

## 2019-06-21 NOTE — PROGRESS NOTES
"Ortho Spine Progress Note     LOS: 2 days   Patient Care Team:  Malick Cruz DO as PCP - General (Family Medicine)    Subjective Pt was sleeping when I walked in. I woke him. Back still \"very sore.\" No other complaints.    Objective     Vital Signs:  BP 95/57 (BP Location: Right arm, Patient Position: Lying)   Pulse 87   Temp 97.8 °F (36.6 °C) (Oral)   Resp 16   Ht 170.2 cm (67\")   Wt 109 kg (241 lb)   SpO2 98%   BMI 37.75 kg/m²          Labs:  Lab Results (last 24 hours)     ** No results found for the last 24 hours. **          Physical Exam:  Neurovascular intact.  Calves soft, non-tender.      Assessment/Plan   Doing well. Slow progress with PT/OT. Will Cont PT/OT today then plan on D/C home tomorrow with HH. Drain out today.    Andriy Fink MD  06/21/19  7:28 AM      "

## 2019-06-21 NOTE — PROGRESS NOTES
"IM progress note      Mckay Hunt  [id number]  1965     LOS: 2 days     Attending: Milan Valdez MD    Primary Care Provider: Malick Cruz DO      Chief Complaint/Reason for visit:  Back pain    Subjective   Doing well. Good pain control. No new complaints. Denies f/c/n/v/sob/cp.    Objective     Vital Signs  Visit Vitals  /62 (BP Location: Right arm, Patient Position: Sitting)   Pulse 111   Temp 98.2 °F (36.8 °C) (Oral)   Resp 18   Ht 170.2 cm (67\")   Wt 109 kg (241 lb)   SpO2 97%   BMI 37.75 kg/m²     Temp (24hrs), Av °F (36.7 °C), Min:97.7 °F (36.5 °C), Max:98.3 °F (36.8 °C)      Nutrition: PO    Respiratory: RA    Physical Therapy: Pt increased gait distance to 110 feet with SBA and RW, limited by pain. Pt required less assist for functional mobility and recalls back precautions. Will continue to progress mobility as able.    Physical Exam:     General Appearance:    Alert, cooperative, in no acute distress   Head:    Normocephalic, without obvious abnormality, atraumatic    Lungs:     Normal effort, symmetric chest rise, no crepitus, clear to      auscultation bilaterally             Heart:    Regular rhythm and normal rate, normal S1 and S2   Abdomen:     Normal bowel sounds, no masses, no organomegaly, soft        non-tender, non-distended, no guarding, no rebound                tenderness   Extremities:   No clubbing, cyanosis or edema.  No deformities.    Pulses:   Pulses palpable and equal bilaterally   Skin:   No bleeding, bruising or rash. Back dressing CDI. HV- out   Neurologic:   Moves all extremities with no obvious focal motor deficit.  Cranial nerves 2 - 12 grossly intact. Flexion and dorsiflexion intact bilateral feet.       Results Review:     I reviewed the patient's new clinical results.   Results from last 7 days   Lab Units 19  0544   WBC 10*3/mm3 11.57*   HEMOGLOBIN g/dL 10.9*   HEMATOCRIT % 34.1*   PLATELETS 10*3/mm3 221     Results from last 7 days   Lab " Units 06/20/19  0544 06/19/19  0645   SODIUM mmol/L 130*  --    POTASSIUM mmol/L 5.0 4.0   CHLORIDE mmol/L 96*  --    CO2 mmol/L 21.0*  --    BUN mg/dL 14  --    CREATININE mg/dL 1.02  --    CALCIUM mg/dL 7.7*  --    GLUCOSE mg/dL 158*  --      I reviewed the patient's new imaging including images and reports.    All medications reviewed.     allopurinol 300 mg Oral Daily   atorvastatin 10 mg Oral Nightly   cetirizine 10 mg Oral Daily   colchicine 0.6 mg Oral Daily   DULoxetine 60 mg Oral Daily   famotidine 20 mg Intravenous Q12H   Or      famotidine 20 mg Oral Q12H   levothyroxine 50 mcg Oral Q AM   lisinopril 10 mg Oral Daily   metoprolol tartrate 100 mg Oral Q12H   sodium chloride 3 mL Intravenous Q12H       Assessment/Plan     S/P lumbar fusion    Back pain    Hypothyroid    HTN (hypertension)    Leukocytosis, mild, likely reactive    Acute blood loss anemia, mild, asymptomatic    HLD (hyperlipidemia)    Acute postoperative pain    Hyponatremia      Plan  1. PT/OT- ambulate  2. Pain control-prns   3. IS-encouraged  4. DVT proph- [facility]  5. Bowel regimen  6. Monitor post-op labs  7. DC planning for home tomorrow    HTN, HLD  - Continue home lisinopril, lopressor and statin  - Monitor BP   - Holding parameters for BP meds  - Labetalol PRN for SBP>170     Hypothyroid  - Continue home synthroid      RADHA Pereira  06/21/19  3:12 PM

## 2019-06-21 NOTE — PROGRESS NOTES
Continued Stay Note  [facility]     Patient Name: Mckay Hunt  MRN: [id number]  Today's Date: 6/21/2019    Admit Date: 6/19/2019    Discharge Plan     Row Name 06/21/19 1225       Plan    Plan  Home with family assistance, [facility] and [facility] Transportation    Patient/Family in Agreement with Plan  yes    Plan Comments  Mr. Hunt is to be discharged tomorrow per Dr. Fink's note.  Home Health has already been arranged with [facility] for SN and PT.  Mr. Hunt will have assistance at home from his daughter.    [facility] Medicaid Transportation will be set up by Tania, social work, for Saturday transport and DC orders must be in EPIC by 11am.  Notified Elo GARCIA.  CM will continue to follow.    Final Discharge Disposition Code  06 - home with home health care        Discharge Codes    No documentation.             Sara Guzmán

## 2019-06-21 NOTE — PROGRESS NOTES
Continued Stay Note  [facility]     Patient Name: Mckay Hunt  MRN: [id number]  Today's Date: 6/21/2019    Admit Date: 6/19/2019    Discharge Plan     Row Name 06/21/19 1834       Plan    Plan  [facility] at discharge    Patient/Family in Agreement with Plan  yes    Plan Comments  Once D/C orders are in on Saturday, have the weekend CM call [facility] to request transportation home/.  [facility] phone number is [phone]. They will not come if pt. Is not discharged.  Staff RN can call the weekend CM to notify them of D/C.  CM will need to give [facility] the unit or RN phone number to call when the  is on their way.  Transport will need to take pt to the [address] for .        Discharge Codes    No documentation.             RAVINDRA Ramos

## 2019-06-21 NOTE — PLAN OF CARE
Problem: Patient Care Overview  Goal: Plan of Care Review  Outcome: Ongoing (interventions implemented as appropriate)   06/21/19 6600   OTHER   Outcome Summary Patient ambulated 110 feet with rolling walker for support, very slow pace with guarded posture. Cues to lower shoulders and to lessen  on walker. Tolerance to activity limited by pain.   Coping/Psychosocial   Plan of Care Reviewed With patient   Plan of Care Review   Progress improving

## 2019-06-21 NOTE — PLAN OF CARE
Problem: Patient Care Overview  Goal: Plan of Care Review  Outcome: Ongoing (interventions implemented as appropriate)   06/21/19 0339   OTHER   Outcome Summary Pt has rested better this shift. VSS, Coverderm dressing CDI, hemovac in place, 75ml output thus far. Pt continues to ask for pain medication frequently, but has fallen asleep before IV pain medication could be given up to this point. Voiding well, no significant issues overnight.    Coping/Psychosocial   Plan of Care Reviewed With patient   Plan of Care Review   Progress improving       Problem: Fall Risk (Adult)  Goal: Identify Related Risk Factors and Signs and Symptoms  Outcome: Ongoing (interventions implemented as appropriate)    Goal: Absence of Fall  Outcome: Ongoing (interventions implemented as appropriate)      Problem: Pain, Chronic (Adult)  Goal: Identify Related Risk Factors and Signs and Symptoms  Outcome: Ongoing (interventions implemented as appropriate)    Goal: Acceptable Pain/Comfort Level and Functional Ability  Outcome: Ongoing (interventions implemented as appropriate)      Problem: Laminectomy/Foraminotomy/Discectomy (Adult)  Goal: Signs and Symptoms of Listed Potential Problems Will be Absent, Minimized or Managed (Laminectomy/Foraminotomy/Discectomy)  Outcome: Ongoing (interventions implemented as appropriate)    Goal: Anesthesia/Sedation Recovery  Outcome: Outcome(s) achieved Date Met: 06/21/19

## 2019-06-21 NOTE — THERAPY TREATMENT NOTE
Acute Care - Physical Therapy Treatment Note  [facility]     Patient Name: Mckay Hunt  : 1965  MRN: [id number]  Today's Date: 2019  Onset of Illness/Injury or Date of Surgery: 19  Date of Referral to PT: 19  Referring Physician: MD José Miguel     Admit Date: 2019    Visit Dx:    ICD-10-CM ICD-9-CM   1. Impaired functional mobility, balance, gait, and endurance Z74.09 V49.89   2. S/P lumbar fusion Z98.1 V45.4   3. Impaired mobility and ADLs Z74.09 799.89     Patient Active Problem List   Diagnosis   • Arthritis of left hip   • Hypothyroid   • HTN (hypertension)   • Status post total replacement of left hip   • Leukocytosis, mild, likely reactive   • Acute blood loss anemia, mild, asymptomatic   • Back pain   • S/P lumbar fusion   • HLD (hyperlipidemia)   • Acute postoperative pain   • Hyponatremia       Therapy Treatment    Rehabilitation Treatment Summary     Row Name 19 1435             Treatment Time/Intention    Discipline  physical therapy assistant  -AS      Document Type  therapy note (daily note)  -AS      Subjective Information  complains of;pain  -AS      Mode of Treatment  physical therapy  -AS      Patient/Family Observations  no family at bedside  -AS      Patient Effort  good  -AS      Existing Precautions/Restrictions  fall;spinal  -AS      Recorded by [AS] Ara Cadena, [facility] 19 1512      Row Name 19 1435             Cognitive Assessment/Intervention- PT/OT    Affect/Mental Status (Cognitive)  WNL  -AS      Orientation Status (Cognition)  oriented x 4  -AS      Follows Commands (Cognition)  WNL  -AS      Safety Deficit (Cognitive)  mild deficit;safety precautions awareness;safety precautions follow-through/compliance  -AS      Personal Safety Interventions  fall prevention program maintained;gait belt;nonskid shoes/slippers when out of bed;other (see comments)  -AS      Recorded by [AS] Ara Cadena PTA 19 5402      Row Name  06/21/19 1435             Bed Mobility Assessment/Treatment    Comment (Bed Mobility)  not tested, patient sitting UIC  -AS      Recorded by [AS] Ara Cadena, [facility] 06/21/19 1516      Row Name 06/21/19 1435             Sit-Stand Transfer    Sit-Stand Soso (Transfers)  verbal cues;contact guard  -AS      Assistive Device (Sit-Stand Transfers)  walker, front-wheeled  -AS      Recorded by [AS] Ara Cadena, [facility] 06/21/19 1516      Row Name 06/21/19 1435             Stand-Sit Transfer    Stand-Sit Soso (Transfers)  verbal cues;contact guard  -AS      Assistive Device (Stand-Sit Transfers)  walker, front-wheeled  -AS      Recorded by [AS] Ara Cadena, [facility] 06/21/19 1516      Row Name 06/21/19 1435             Gait/Stairs Assessment/Training    42086 - Gait Training Minutes   15  -AS      Gait/Stairs Assessment/Training  gait/ambulation assistive device  -AS      Soso Level (Gait)  verbal cues;contact guard  -AS      Assistive Device (Gait)  walker, front-wheeled  -AS      Distance in Feet (Gait)  110  -AS      Pattern (Gait)  step-through  -AS      Deviations/Abnormal Patterns (Gait)  keyana decreased;gait speed decreased  -AS      Comment (Gait/Stairs)  Patient ambulated 110 feet with rolling walker for support, very slow pace with guarded posture. Cues to lower shoulders and lessen  on walker.  -AS      Recorded by [AS] Ara Cadena, [facility] 06/21/19 1516      Row Name 06/21/19 1435             Motor Skills Assessment/Interventions    Additional Documentation  Therapeutic Exercise (Group)  -AS      Recorded by [AS] Ara Cadena, [facility] 06/21/19 1516      Row Name 06/21/19 1435             Therapeutic Exercise    59398 - PT Therapeutic Exercise Minutes  8  -AS      Recorded by [AS] Ara Cadena, [facility] 06/21/19 1516      Row Name 06/21/19 1435             Therapeutic Exercise    Lower Extremity (Therapeutic Exercise)  LAQ (long arc quad), bilateral;marching  while seated  -AS      Lower Extremity Range of Motion (Therapeutic Exercise)  ankle dorsiflexion/plantar flexion, bilateral  -AS      Exercise Type (Therapeutic Exercise)  AROM (active range of motion)  -AS      Position (Therapeutic Exercise)  seated  -AS      Sets/Reps (Therapeutic Exercise)  1/10  -AS      Recorded by [AS] Ara Cadena, [facility] 06/21/19 1516      Row Name 06/21/19 1435             Positioning and Restraints    Pre-Treatment Position  bathroom  -AS      Post Treatment Position  chair  -AS      In Chair  reclined;call light within reach;encouraged to call for assist  -AS      Recorded by [AS] Ara Cadena, PTA 06/21/19 1516      Row Name 06/21/19 1435             Pain Scale: Numbers Pre/Post-Treatment    Pain Scale: Numbers, Pretreatment  7/10  -AS      Pain Scale: Numbers, Post-Treatment  7/10  -AS      Pain Location - Side  Bilateral  -AS      Pain Location - Orientation  lower  -AS      Pain Location  back  -AS      Pain Intervention(s)  Repositioned;Ambulation/increased activity;Cold applied  -AS      Recorded by [AS] Ara Cadena, PTA 06/21/19 1516      Row Name                Wound 06/19/19 1019 Other (See comments) back incision    Wound - Properties Group Date first assessed: 06/19/19 [KS] Time first assessed: 1019 [KS] Side: Other (See comments) [KS] Location: back [KS] Type: incision [KS] Recorded by:  [KS] Maria Alejandra Alatorre RN 06/19/19 1019      User Key  (r) = Recorded By, (t) = Taken By, (c) = Cosigned By    Initials Name Effective Dates Discipline    AS Ara Cadena, [facility] 06/22/15 -  PT    Maria Alejandra Mitchell RN 06/16/16 -  Nurse          Wound 06/19/19 1019 Other (See comments) back incision (Active)   Dressing Appearance dry;moist drainage 6/21/2019  2:05 PM   Closure None 6/20/2019  8:11 PM   Drainage Amount small 6/21/2019  2:05 PM   Dressing Care, Wound dressing changed 6/21/2019  9:24 AM           Physical Therapy Education     Title: PT OT SLP Therapies (In  Progress)     Topic: Physical Therapy (In Progress)     Point: Mobility training (In Progress)     Learning Progress Summary           Patient Acceptance, E, NR by AS at 6/21/2019  3:16 PM    Acceptance, E,D, VU by  at 6/20/2019 11:27 AM    Comment:  Reviewed HEP, back precautions, gait mechanics, benefits of mobility    Acceptance, E,D,H, VU,NR by  at 6/19/2019  5:37 PM    Comment:  Edu re: back precautions, gait mechanics, benefits of mobility. Issued HEP handout                   Point: Home exercise program (In Progress)     Learning Progress Summary           Patient Acceptance, E, NR by AS at 6/21/2019  3:16 PM    Acceptance, E,D, VU by  at 6/20/2019 11:27 AM    Comment:  Reviewed HEP, back precautions, gait mechanics, benefits of mobility                   Point: Body mechanics (In Progress)     Learning Progress Summary           Patient Acceptance, E, NR by AS at 6/21/2019  3:16 PM    Acceptance, E,D, VU by  at 6/20/2019 11:27 AM    Comment:  Reviewed HEP, back precautions, gait mechanics, benefits of mobility    Acceptance, E,D,H, VU,NR by  at 6/19/2019  5:37 PM    Comment:  Edu re: back precautions, gait mechanics, benefits of mobility. Issued HEP handout                   Point: Precautions (In Progress)     Learning Progress Summary           Patient Acceptance, E, NR by AS at 6/21/2019  3:16 PM    Acceptance, E,D, VU by  at 6/20/2019 11:27 AM    Comment:  Reviewed HEP, back precautions, gait mechanics, benefits of mobility    Acceptance, E,D,H, VU,NR by  at 6/19/2019  5:37 PM    Comment:  Edu re: back precautions, gait mechanics, benefits of mobility. Issued HEP handout                               User Key     Initials Effective Dates Name Provider Type Discipline    AS 06/22/15 -  Ara Cadena, PTA Physical Therapy Assistant PT     04/03/18 -  Vincent Patel PT Physical Therapist PT                PT Recommendation and Plan     Plan of Care Reviewed With: patient  Progress:  improving  Outcome Summary: Patient ambulated 110 feet with rolling walker for support, very slow pace with guarded posture. Cues to lower shoulders and to lessen  on walker. Tolerance to activity limited by pain.  Outcome Measures     Row Name 06/21/19 1435 06/20/19 1127 06/20/19 0928       How much help from another person do you currently need...    Turning from your back to your side while in flat bed without using bedrails?  3  -AS  3  -MJ  --    Moving from lying on back to sitting on the side of a flat bed without bedrails?  3  -AS  3  -MJ  --    Moving to and from a bed to a chair (including a wheelchair)?  3  -AS  3  -MJ  --    Standing up from a chair using your arms (e.g., wheelchair, bedside chair)?  3  -AS  3  -MJ  --    Climbing 3-5 steps with a railing?  3  -AS  3  -MJ  --    To walk in hospital room?  3  -AS  3  -MJ  --    AM-PAC 6 Clicks Score  18  -AS  18  -MJ  --       How much help from another is currently needed...    Putting on and taking off regular lower body clothing?  --  --  3  -HK    Bathing (including washing, rinsing, and drying)  --  --  3  -HK    Toileting (which includes using toilet bed pan or urinal)  --  --  3  -HK    Putting on and taking off regular upper body clothing  --  --  4  -HK    Taking care of personal grooming (such as brushing teeth)  --  --  3  -HK    Eating meals  --  --  4  -HK    Score  --  --  20  -HK       Functional Assessment    Outcome Measure Options  AM-PAC 6 Clicks Basic Mobility (PT)  -AS  AM-PAC 6 Clicks Basic Mobility (PT)  -MJ  AM-PAC 6 Clicks Daily Activity (OT)  -HK    Row Name 06/19/19 1737             How much help from another person do you currently need...    Turning from your back to your side while in flat bed without using bedrails?  3  -MJ      Moving from lying on back to sitting on the side of a flat bed without bedrails?  3  -MJ      Moving to and from a bed to a chair (including a wheelchair)?  3  -MJ      Standing up from a chair  using your arms (e.g., wheelchair, bedside chair)?  3  -MJ      Climbing 3-5 steps with a railing?  3  -MJ      To walk in hospital room?  3  -MJ      AM-PAC 6 Clicks Score  18  -MJ         Functional Assessment    Outcome Measure Options  AM-PAC 6 Clicks Basic Mobility (PT)  -MJ        User Key  (r) = Recorded By, (t) = Taken By, (c) = Cosigned By    Initials Name Provider Type    AS Ara Cadena, [facility] Physical Therapy Assistant    Vincent Sotelo, PT Physical Therapist    HK Felicita Rose, OT Occupational Therapist         Time Calculation:   PT Charges     Row Name 06/21/19 1435             Time Calculation    Start Time  1435  -AS      PT Received On  06/21/19  -AS      PT Goal Re-Cert Due Date  06/29/19  -AS         Timed Charges    84121 - PT Therapeutic Exercise Minutes  8  -AS      73452 - Gait Training Minutes   15  -AS        User Key  (r) = Recorded By, (t) = Taken By, (c) = Cosigned By    Initials Name Provider Type    AS Ara Cadena PTA Physical Therapy Assistant        Therapy Charges for Today     Code Description Service Date Service Provider Modifiers Qty    94545949046 HC PT THER PROC EA 15 MIN 6/21/2019 Ara Cadena, [facility] GP 1    24138955974 HC GAIT TRAINING EA 15 MIN 6/21/2019 Ara Cadena PTA GP 1          PT G-Codes  Outcome Measure Options: AM-PAC 6 Clicks Basic Mobility (PT)  AM-PAC 6 Clicks Score: 18  Score: 20    Ara Cadena PTA  6/21/2019

## 2019-06-22 VITALS
TEMPERATURE: 98.4 F | DIASTOLIC BLOOD PRESSURE: 62 MMHG | SYSTOLIC BLOOD PRESSURE: 119 MMHG | HEIGHT: 67 IN | RESPIRATION RATE: 16 BRPM | BODY MASS INDEX: 37.83 KG/M2 | WEIGHT: 241 LBS | HEART RATE: 103 BPM | OXYGEN SATURATION: 94 %

## 2019-06-22 PROCEDURE — 97116 GAIT TRAINING THERAPY: CPT

## 2019-06-22 PROCEDURE — 97110 THERAPEUTIC EXERCISES: CPT

## 2019-06-22 RX ORDER — OXYCODONE AND ACETAMINOPHEN 10; 325 MG/1; MG/1
1 TABLET ORAL EVERY 6 HOURS PRN
Qty: 50 TABLET | Refills: 0
Start: 2019-06-22

## 2019-06-22 RX ORDER — DOCUSATE SODIUM 100 MG/1
100 CAPSULE, LIQUID FILLED ORAL 2 TIMES DAILY
Qty: 40 CAPSULE | Refills: 0 | Status: SHIPPED | OUTPATIENT
Start: 2019-06-22

## 2019-06-22 RX ADMIN — COLCHICINE 0.6 MG: 0.6 TABLET, FILM COATED ORAL at 07:50

## 2019-06-22 RX ADMIN — LEVOTHYROXINE SODIUM 50 MCG: 50 TABLET ORAL at 04:50

## 2019-06-22 RX ADMIN — CETIRIZINE HYDROCHLORIDE 10 MG: 10 TABLET, FILM COATED ORAL at 07:52

## 2019-06-22 RX ADMIN — ALLOPURINOL 300 MG: 300 TABLET ORAL at 07:51

## 2019-06-22 RX ADMIN — OXYCODONE HYDROCHLORIDE AND ACETAMINOPHEN 1 TABLET: 10; 325 TABLET ORAL at 09:06

## 2019-06-22 RX ADMIN — OXYCODONE HYDROCHLORIDE AND ACETAMINOPHEN 1 TABLET: 10; 325 TABLET ORAL at 04:50

## 2019-06-22 RX ADMIN — CYCLOBENZAPRINE HYDROCHLORIDE 10 MG: 10 TABLET, FILM COATED ORAL at 07:52

## 2019-06-22 RX ADMIN — DULOXETINE HYDROCHLORIDE 60 MG: 60 CAPSULE, DELAYED RELEASE ORAL at 07:51

## 2019-06-22 RX ADMIN — OXYCODONE HYDROCHLORIDE AND ACETAMINOPHEN 1 TABLET: 10; 325 TABLET ORAL at 12:41

## 2019-06-22 RX ADMIN — FAMOTIDINE 20 MG: 20 TABLET ORAL at 07:50

## 2019-06-22 RX ADMIN — METOPROLOL TARTRATE 100 MG: 100 TABLET ORAL at 07:51

## 2019-06-22 RX ADMIN — LISINOPRIL 10 MG: 10 TABLET ORAL at 07:52

## 2019-06-22 NOTE — PROGRESS NOTES
Case Management Discharge Note    Final Note: Patient will need transportation home. Transportation via [employer] approved by Hermila. Curry arranged online. Notified answering service with [facility] that patient was discharging home today. The RN on call will call back if she has any questions. Faxed discharge summary to the fax number listed in Epic.     Destination      No service has been selected for the patient.      Durable Medical Equipment      No service has been selected for the patient.      Dialysis/Infusion      No service has been selected for the patient.      Home Medical Care - Selection Complete      Service Provider Request Status Selected Services Address Phone Number Fax Number    [facility]  [facility] [address] [facility] [address] [phone]      Therapy      No service has been selected for the patient.      Community Resources      No service has been selected for the patient.        Transportation Services  Taxi: other([employer])  W/C Van: Other([facility])  Other: Other([employer])    Final Discharge Disposition Code: 06 - home with home health care

## 2019-06-22 NOTE — THERAPY TREATMENT NOTE
Acute Care - Physical Therapy Treatment Note  [facility]     Patient Name: Mckay Hunt  : 1965  MRN: [id number]  Today's Date: 2019  Onset of Illness/Injury or Date of Surgery: 19  Date of Referral to PT: 19  Referring Physician: MD José Miguel     Admit Date: 2019    Visit Dx:    ICD-10-CM ICD-9-CM   1. Impaired functional mobility, balance, gait, and endurance Z74.09 V49.89   2. S/P lumbar fusion Z98.1 V45.4   3. Impaired mobility and ADLs Z74.09 799.89     Patient Active Problem List   Diagnosis   • Arthritis of left hip   • Hypothyroid   • HTN (hypertension)   • Status post total replacement of left hip   • Leukocytosis, mild, likely reactive   • Acute blood loss anemia, mild, asymptomatic   • Back pain   • S/P lumbar fusion   • HLD (hyperlipidemia)   • Acute postoperative pain   • Hyponatremia       Therapy Treatment    Rehabilitation Treatment Summary     Row Name 19 1025             Treatment Time/Intention    Discipline  physical therapist  -EJ      Document Type  therapy note (daily note)  -EJ      Subjective Information  complains of;pain  -EJ      Mode of Treatment  physical therapy  -EJ      Patient/Family Observations  no family present  -EJ      Care Plan Review  care plan/treatment goals reviewed;risks/benefits reviewed;current/potential barriers reviewed;patient/other agree to care plan  -EJ      Patient Effort  good  -EJ      Comment  RN pre medicated, pt requested when PT checked on him at 830 in AM  -EJ      Existing Precautions/Restrictions  fall;spinal  -EJ      Recorded by [EJ] Teresita Marti, PT 19 1340      Row Name 19 1025             Cognitive Assessment/Intervention- PT/OT    Affect/Mental Status (Cognitive)  WNL  -EJ      Orientation Status (Cognition)  oriented x 4  -EJ      Follows Commands (Cognition)  WNL  -EJ      Safety Deficit (Cognitive)  safety precautions awareness;safety precautions follow-through/compliance;insight into  deficits/self awareness;awareness of need for assistance  -EJ      Personal Safety Interventions  fall prevention program maintained;gait belt;nonskid shoes/slippers when out of bed  -EJ      Recorded by [EJ] Teresita Marti, PT 06/22/19 1340      Row Name 06/22/19 1025             Safety Issues, Functional Mobility    Safety Issues Affecting Function (Mobility)  safety precautions follow-through/compliance;safety precaution awareness;positioning of assistive device;insight into deficits/self awareness  -EJ      Impairments Affecting Function (Mobility)  pain;range of motion (ROM)  -EJ      Comment, Safety Issues/Impairments (Mobility)  pt appears to hold breath when in pain or anticipating pain.  -EJ      Recorded by [EJ] Teresita Marti, PT 06/22/19 1340      Row Name 06/22/19 1025             Bed Mobility Assessment/Treatment    Bed Mobility Assessment/Treatment  supine-sit  -EJ      Supine-Sit [place] (Bed Mobility)  supervision;verbal cues  -EJ      Assistive Device (Bed Mobility)  bed rails;head of bed elevated  -EJ      Recorded by [EJ] Teresita Marti, PT 06/22/19 1340      Row Name 06/22/19 1025             Transfer Assessment/Treatment    Transfer Assessment/Treatment  sit-stand transfer;stand-sit transfer;bed-chair transfer  -EJ      Recorded by [EJ] Teresita Marti, PT 06/22/19 1340      Row Name 06/22/19 1025             Bed-Chair Transfer    Bed-Chair [place] (Transfers)  verbal cues;contact guard  -EJ      Assistive Device (Bed-Chair Transfers)  walker, front-wheeled  -EJ      Recorded by [EJ] Teresita Marti, PT 06/22/19 1340      Row Name 06/22/19 1025             Sit-Stand Transfer    Sit-Stand [place] (Transfers)  verbal cues;contact guard  -EJ      Assistive Device (Sit-Stand Transfers)  walker, front-wheeled  -EJ      Recorded by [EJ] Teresita Marti, PT 06/22/19 1340      Row Name 06/22/19 1025             Stand-Sit Transfer    Stand-Sit [place] (Transfers)   verbal cues;contact guard  -EJ      Assistive Device (Stand-Sit Transfers)  walker, front-wheeled  -EJ      Recorded by [EJ] Teresita Marti, PT 06/22/19 1340      Row Name 06/22/19 1025             Gait/Stairs Assessment/Training    15625 - Gait Training Minutes   17  -EJ      [place] Level (Gait)  verbal cues;contact guard  -EJ      Assistive Device (Gait)  walker, front-wheeled  -EJ      Distance in Feet (Gait)  130  -EJ      Pattern (Gait)  step-through  -EJ      Deviations/Abnormal Patterns (Gait)  keyana decreased;gait speed decreased  -EJ      Bilateral Gait Deviations  heel strike decreased  -EJ      Comment (Gait/Stairs)  Pt ambulates with step through gait pattern at slow pace, with guarded posture, often holding breath. Pt cued for increased DF, and PLB.  -EJ      Recorded by [EJ] Teresita Marti, PT 06/22/19 1340      Row Name 06/22/19 1025             Therapeutic Exercise    28116 - PT Therapeutic Exercise Minutes  8  -EJ      55204 - PT Therapeutic Activity Minutes  3  -EJ      Recorded by [EJ] Teresita Marti, PT 06/22/19 1340      Row Name 06/22/19 1025             Therapeutic Exercise    Lower Extremity (Therapeutic Exercise)  LAQ (long arc quad), bilateral;quad sets, bilateral;gluteal sets  -EJ      Lower Extremity Range of Motion (Therapeutic Exercise)  ankle dorsiflexion/plantar flexion, bilateral;hip internal/external rotation, bilateral  -EJ      Exercise Type (Therapeutic Exercise)  AROM (active range of motion)  -EJ      Position (Therapeutic Exercise)  seated  -EJ      Sets/Reps (Therapeutic Exercise)  1/10  -EJ      Recorded by [EJ] Teresita Marti, PT 06/22/19 1340      Row Name 06/22/19 1025             Positioning and Restraints    Pre-Treatment Position  in bed  -EJ      Post Treatment Position  chair  -EJ      In Chair  notified nsg;reclined;call light within reach;encouraged to call for assist;with family/caregiver  -EJ      Recorded by [EJ] Teresita Marti, PT  06/22/19 1340      Row Name 06/22/19 1025             Pain Scale: Numbers Pre/Post-Treatment    Pain Scale: Numbers, Pretreatment  5/10  -EJ      Pain Scale: Numbers, Post-Treatment  6/10  -EJ      Pain Location - Orientation  lower  -EJ      Pain Location  back  -EJ      Pain Intervention(s)  Repositioned;Ambulation/increased activity  -EJ      Recorded by [EJ] Teresita Marti, PT 06/22/19 1340      Row Name                Wound 06/19/19 1019 Other (See comments) back incision    Wound - Properties Group Date first assessed: 06/19/19 [KS] Time first assessed: 1019 [KS] Side: Other (See comments) [KS] Location: back [KS] Type: incision [KS] Recorded by:  [KS] Maria Alejandra Alatorre RN 06/19/19 1019    Row Name 06/22/19 1025             Coping    Observed Emotional State  accepting;cooperative  -EJ      Verbalized Emotional State  acceptance  -EJ      Recorded by [EJ] Teresita Marti, PT 06/22/19 1340      Row Name 06/22/19 1025             Plan of Care Review    Plan of Care Reviewed With  patient  -EJ      Recorded by [EJ] Teresita Marti, PT 06/22/19 1340      Row Name 06/22/19 1025             Outcome Summary/Treatment Plan (PT)    Daily Summary of Progress (PT)  progress toward functional goals is gradual  -EJ      Anticipated Discharge Disposition (PT)  home with assist;home with home health  -EJ      Recorded by [EJ] Teresita Marti, PT 06/22/19 1340        User Key  (r) = Recorded By, (t) = Taken By, (c) = Cosigned By    Initials Name Effective Dates Discipline    EJ Teresita Marti, PT 11/20/18 -  PT    Maria Alejandra Mitchell RN 06/16/16 -  Nurse          Wound 06/19/19 1019 Other (See comments) back incision (Active)   Dressing Appearance intact;dry 6/22/2019  8:00 AM   Drainage Amount none 6/22/2019  8:00 AM   Dressing Care, Wound gauze;border dressing 6/21/2019  7:55 PM           Physical Therapy Education     Title: PT OT SLP Therapies (In Progress)     Topic: Physical Therapy (Done)     Point:  Mobility training (Done)     Learning Progress Summary           Patient Acceptance, E, VU,NR by TIANNA at 6/22/2019 10:25 AM    Acceptance, E, NR by AS at 6/21/2019  3:16 PM    Acceptance, E,D, VU by SIDNEY at 6/20/2019 11:27 AM    Comment:  Reviewed HEP, back precautions, gait mechanics, benefits of mobility    Acceptance, E,D,H, VU,NR by SIDNEY at 6/19/2019  5:37 PM    Comment:  Edu re: back precautions, gait mechanics, benefits of mobility. Issued HEP handout                   Point: Home exercise program (Done)     Learning Progress Summary           Patient Acceptance, E, VU,NR by TIANNA at 6/22/2019 10:25 AM    Acceptance, E, NR by AS at 6/21/2019  3:16 PM    Acceptance, E,D, VU by SIDNEY at 6/20/2019 11:27 AM    Comment:  Reviewed HEP, back precautions, gait mechanics, benefits of mobility                   Point: Body mechanics (Done)     Learning Progress Summary           Patient Acceptance, E, VU,NR by TIANNA at 6/22/2019 10:25 AM    Acceptance, E, NR by AS at 6/21/2019  3:16 PM    Acceptance, E,D, VU by SIDNEY at 6/20/2019 11:27 AM    Comment:  Reviewed HEP, back precautions, gait mechanics, benefits of mobility    Acceptance, E,D,H, VU,NR by SIDNEY at 6/19/2019  5:37 PM    Comment:  Edu re: back precautions, gait mechanics, benefits of mobility. Issued HEP handout                   Point: Precautions (Done)     Learning Progress Summary           Patient Acceptance, E, VU,NR by TIANNA at 6/22/2019 10:25 AM    Acceptance, E, NR by AS at 6/21/2019  3:16 PM    Acceptance, E,D, VU by SIDNEY at 6/20/2019 11:27 AM    Comment:  Reviewed HEP, back precautions, gait mechanics, benefits of mobility    Acceptance, E,D,H, VU,NR by SIDNEY at 6/19/2019  5:37 PM    Comment:  Edu re: back precautions, gait mechanics, benefits of mobility. Issued HEP handout                               User Key     Initials Effective Dates Name Provider Type Discipline     11/20/18 -  Teresita Marti, PT Physical Therapist PT    AS 06/22/15 -  Ara Cadena, PTA  Physical Therapy Assistant PT     04/03/18 -  Vincent Patel PT Physical Therapist PT                PT Recommendation and Plan  Anticipated Discharge Disposition (PT): home with assist, home with home health  Outcome Summary/Treatment Plan (PT)  Daily Summary of Progress (PT): progress toward functional goals is gradual  Anticipated Discharge Disposition (PT): home with assist, home with home health  Plan of Care Reviewed With: patient  Progress: improving  Outcome Summary: Pt ambulates 130 ft in [place] with RWx, Step through gait pattern. Pt is given CGA for t/f and ambulation, but he uses the bed rail and performs bed mobility with supervision. Continues to have gait abnormalities. HHPT recommended.  Outcome Measures     Row Name 06/22/19 1025 06/21/19 1435 06/20/19 1127       How much help from another person do you currently need...    Turning from your back to your side while in flat bed without using bedrails?  3  -EJ  3  -AS  3  -MJ    Moving from lying on back to sitting on the side of a flat bed without bedrails?  3  -EJ  3  -AS  3  -MJ    Moving to and from a bed to a chair (including a wheelchair)?  3  -EJ  3  -AS  3  -MJ    Standing up from a chair using your arms (e.g., wheelchair, bedside chair)?  3  -EJ  3  -AS  3  -MJ    Climbing 3-5 steps with a railing?  3  -EJ  3  -AS  3  -MJ    To walk in hospital room?  3  -EJ  3  -AS  3  -MJ    AM-PAC 6 Clicks Score  18  -EJ  18  -AS  18  -MJ       Functional Assessment    Outcome Measure Options  AM-PAC 6 Clicks Basic Mobility (PT)  -EJ  AM-PAC 6 Clicks Basic Mobility (PT)  -AS  AM-PAC 6 Clicks Basic Mobility (PT)  -    Row Name 06/20/19 0928 06/19/19 1737          How much help from another person do you currently need...    Turning from your back to your side while in flat bed without using bedrails?  --  3  -MJ     Moving from lying on back to sitting on the side of a flat bed without bedrails?  --  3  -MJ     Moving to and from a bed to a chair  (including a wheelchair)?  --  3  -MJ     Standing up from a chair using your arms (e.g., wheelchair, bedside chair)?  --  3  -MJ     Climbing 3-5 steps with a railing?  --  3  -MJ     To walk in hospital room?  --  3  -MJ     AM-PAC 6 Clicks Score  --  18  -MJ        How much help from another is currently needed...    Putting on and taking off regular lower body clothing?  3  -HK  --     Bathing (including washing, rinsing, and drying)  3  -HK  --     Toileting (which includes using toilet bed pan or urinal)  3  -HK  --     Putting on and taking off regular upper body clothing  4  -HK  --     Taking care of personal grooming (such as brushing teeth)  3  -HK  --     Eating meals  4  -HK  --     Score  20  -HK  --        Functional Assessment    Outcome Measure Options  AM-PAC 6 Clicks Daily Activity (OT)  -  AM-[facility] 6 Clicks Basic Mobility (PT)  -       User Key  (r) = Recorded By, (t) = Taken By, (c) = Cosigned By    Initials Name Provider Type    Teresita Ferrell, PT Physical Therapist    AS Ara Cadena, PTA Physical Therapy Assistant    Vincent Sotelo, PT Physical Therapist    HK Felicita Rose, OT Occupational Therapist         Time Calculation:   PT Charges     Row Name 06/22/19 1025             Time Calculation    Start Time  1025  -EJ      PT Received On  06/22/19  -      PT Goal Re-Cert Due Date  06/29/19  -         Time Calculation- PT    Total Timed Code Minutes- PT  28 minute(s)  -         Timed Charges    52524 - PT Therapeutic Exercise Minutes  8  -EJ      44782 - Gait Training Minutes   17  -EJ      82854 - PT Therapeutic Activity Minutes  3  -EJ        User Key  (r) = Recorded By, (t) = Taken By, (c) = Cosigned By    Initials Name Provider Type    Teresita Ferrell, PT Physical Therapist        Therapy Charges for Today     Code Description Service Date Service Provider Modifiers Qty    [id number]  PT THER PROC EA 15 MIN 6/22/2019 Teresita Marti, PT  1    [id number]  HC GAIT TRAINING EA 15 MIN 6/22/2019 Teresita Marti, PT GP 1          PT G-Codes  Outcome Measure Options: AM-PAC 6 Clicks Basic Mobility (PT)  AM-PAC 6 Clicks Score: 18  Score: 20    Teresita Chi, PT  6/22/2019

## 2019-06-22 NOTE — PLAN OF CARE
Problem: Patient Care Overview  Goal: Plan of Care Review  Outcome: Ongoing (interventions implemented as appropriate)   06/22/19 1025   OTHER   Outcome Summary Pt ambulates 130 ft in [place] with RWx, Step through gait pattern. Pt is given CGA for t/f and ambulation, but he uses the bed rail and performs bed mobility with supervision. Continues to have gait abnormalities. HHPT recommended.   Coping/Psychosocial   Plan of Care Reviewed With patient   Plan of Care Review   Progress improving

## 2019-06-22 NOTE — PROGRESS NOTES
Continued Stay Note  [facility]     Patient Name: Mckay Hunt  MRN: [id number]  Today's Date: 6/22/2019    Admit Date: 6/19/2019    Discharge Plan     Row Name 06/22/19 1204       Plan    Plan  Transportation Needs; Home with [facility]    Patient/Family in Agreement with Plan  yes    Plan Comments  Spoke with Yumiko with [facility] transportation earlier this morning, approximately 10:39. She states they cannot transport patient because they did not bring him to the hospital originally nor was patient brought to [facility] by ambulance. Patient states his daughter provided his ride, and Yumiko states that because his daughter brought him patient will have to arrange his own transportation home. Patient will try to contact his daughter to see if she can pick him up later today. CM will also look into other options. Patient is ready for discharge. CM will continue to follow.         Discharge Codes    No documentation.       Expected Discharge Date and Time     Expected Discharge Date Expected Discharge Time    Jun 22, 2019             Kim Leung

## 2019-06-22 NOTE — PLAN OF CARE
Problem: Patient Care Overview  Goal: Plan of Care Review  Outcome: Ongoing (interventions implemented as appropriate)   06/21/19 1955 06/22/19 0323   OTHER   Outcome Summary --  Patient rested well this shift. Dressing CDI. Pain treated with PRN medications. Plans for DC today 6/22   Coping/Psychosocial   Plan of Care Reviewed With patient --    Plan of Care Review   Progress --  improving

## 2019-06-22 NOTE — PROGRESS NOTES
Continued Stay Note  [facility]     Patient Name: Mckay Hunt  MRN: [id number]  Today's Date: 6/22/2019    Admit Date: 6/19/2019    Discharge Plan     Row Name 06/22/19 1531       Plan    Plan  Home with [facility]    Plan Comments  Spoke with Ina RN on call with [facility], and she is aware patient has discharged home today.     Row Name 06/22/19 1502       Plan    Plan  Home with [facility]    Patient/Family in Agreement with Plan  yes    Final Discharge Disposition Code  06 - home with home health care    Final Note  Patient will need transportation home. Transportation via [employer] approved by [employer]. [employer] arranged online. Notified answering service with [facility] that patient was discharging home today. The RN on call will call back if she has any questions. Faxed discharge summary to the fax number listed in Epic.         Discharge Codes    No documentation.       Expected Discharge Date and Time     Expected Discharge Date Expected Discharge Time    Jun 22, 2019             Kim Leung

## 2019-06-22 NOTE — DISCHARGE PLACEMENT REQUEST
"Mckay Hunt (53 y.o. Male)     From [place],   [phone]      Date of Birth Social Security Number Address Home Phone MRN    1965  [id number] [id number] [phone] [id number]    Mormonism Marital Status          Nondenominational Single       Admission Date Admission Type Admitting Provider Attending Provider Department, Room/Bed    6/19/19 Elective Milan Valdez MD Vaughan, John J, MD [facility] 3G, S362/1    Discharge Date Discharge Disposition Discharge Destination         Home or Self Care              Attending Provider:  Milan Valdez MD    Allergies:  Mushroom Extract Complex, Indocin [Indomethacin], Other, Soybean-containing Drug Products, Zosyn [Piperacillin Sod-tazobactam So]    Isolation:  None   Infection:  None   Code Status:  CPR    Ht:  170.2 cm (67\")   Wt:  109 kg (241 lb)    Admission Cmt:  None   Principal Problem:  S/P lumbar fusion [Z98.1]                 Active Insurance as of 6/19/2019     Primary Coverage     Payor Plan Insurance Group Employer/Plan Group    [employer] MEDICAID [employer] [id number]     Payor Plan Address Payor Plan Phone Number Payor Plan Fax Number Effective Dates    PO  [phone]  12/14/2016 - None Entered    [facility] [id number]       Subscriber Name Subscriber Birth Date Member ID       MCKAY HUNT 1965 [id number]                 Emergency Contacts      (Rel.) Home Phone Work Phone Mobile Phone    Carolina Hunt (Daughter) -- -- [phone]              Discharge Summary     No notes of this type exist for this encounter.        Discharge Order (From admission, onward)    Start     Ordered    06/22/19 0926  Discharge patient  Once     Comments:  After seen by Dr. DOSS, who will do the med rec.   Expected Discharge Date:  06/22/19    Discharge Disposition:  Home or Self Care        06/22/19 0926 06/22/19 0906  Discharge patient  Once     Comments:  Resume [facility] in 2 " weeks.   Expected Discharge Date:  06/22/19    Discharge Disposition:  Home or Self Care    Physician of Record for Attribution - Please select from Treatment Team:  MARGE ARGUETA [[id number]]    Review needed by CMO to determine Physician of Record:  No       Question Answer Comment   Physician of Record for Attribution - Please select from Treatment Team MARGE ARGUETA    Review needed by CMO to determine Physician of Record No        06/22/19 0907

## 2019-06-22 NOTE — PROGRESS NOTES
"Ortho Spine Progress Note     LOS: 3 days   Patient Care Team:  Malick Cruz DO as PCP - General (Family Medicine)    Subjective Doing well. Back pain adequately controlled with PO meds. Ready to go home.    Objective     Vital Signs:  /62   Pulse 103   Temp 98.4 °F (36.9 °C) (Oral)   Resp 16   Ht 170.2 cm (67\")   Wt 109 kg (241 lb)   SpO2 94%   BMI 37.75 kg/m²          Labs:  Lab Results (last 24 hours)     ** No results found for the last 24 hours. **          Physical Exam:  Neurovascular intact.  Calves soft, non-tender.      Assessment/Plan   Doing well. Home today. Percocet script from Dr. Valdez on chart. F/U in office 2.5-3 weeks post-op for staple removal as already ordered yesterday. OK to shower as of today.    Andriy Fink MD  06/22/19  9:20 AM      "

## 2019-06-22 NOTE — DISCHARGE SUMMARY
Patient Name: Mckay Hunt  MRN: [id number]  : 1965  DOS: 2019    Attending: Milan Valdez MD    Primary Care Provider: Malick Cruz DO    Date of Admission:.2019  6:21 AM    Date of Discharge:  2019    Discharge Diagnosis:     S/P lumbar fusion    Hypothyroid    HTN (hypertension)    Leukocytosis, mild, likely reactive    Acute blood loss anemia, mild, asymptomatic    Back pain    HLD (hyperlipidemia)    Acute postoperative pain    Hyponatremia      Hospital Course    Patient is a 53 y.o. male presented for lumbar fusion by Dr. Valdez.     He underwent surgery under GA. He tolerated surgery well and is admitted for further medical management.     He is known to us from previous admission for left hip replacement in Dec 2016; which he recovered well.    Patient was provided pain medications as needed for pain control.    Adjustments were made to pain medications to optimize postop pain management. Risks and benefits of opiate medications discussed with patient.    He was seen by PT and has progressed well over his stay.  He used an IS for atelectasis prophylaxis and mechanicals for DVT prophylaxis.  Home medications were resumed as appropriate, and labs were monitored and remained fairly stable.     With the progress he has made, he is ready for DC home today.    Discussed with patient regarding plan and he shows understanding and agreement.    Patient will have HHPT following discharge.      Procedures Performed  PREOPERATIVE DIAGNOSIS:  L4-L5 spondylolisthesis with radiculopathy.     POSTOPERATIVE DIAGNOSIS:  L4-L5 spondylolisthesis with radiculopathy.     PROCEDURE PERFORMED:  1.  Transforaminal lumbar interbody fusion, L4-L5, with DePuy interbody fusion cage and bone graft.  2.  Segmental instrumentation at L4-L5 with DePuy transpedicular fixation.  3.  Posterolateral fusion at L4-L5 with bone graft.  4.  Alatorre Barnes type osteotomy at L4-L5 to facilitate restoration of  "lordosis and reduction of spondylolisthesis.  5.  Harvesting of bone graft locally from spinous processes and lamina.     SURGEON:  Milan Valdez MD      Pertinent Test Results:    I reviewed the patient's new clinical results.   Results from last 7 days   Lab Units 19  0544   WBC 10*3/mm3 11.57*   HEMOGLOBIN g/dL 10.9*   HEMATOCRIT % 34.1*   PLATELETS 10*3/mm3 221     Results for KIM EDWARD (MRN [id number]) as of 2019 18:01   Ref. Range 2019 14:09   Hemoglobin Latest Ref Range: 13.0 - 17.7 g/dL 15.3   Hematocrit Latest Ref Range: 37.5 - 51.0 % 46.6     Results from last 7 days   Lab Units 19  0544 19  0645   SODIUM mmol/L 130*  --    POTASSIUM mmol/L 5.0 4.0   CHLORIDE mmol/L 96*  --    CO2 mmol/L 21.0*  --    BUN mg/dL 14  --    CREATININE mg/dL 1.02  --    CALCIUM mg/dL 7.7*  --    GLUCOSE mg/dL 158*  --      I reviewed the patient's new imaging including images and reports.      Physical therapy: Pt ambulates 130 ft in cabello with RWx, Step through gait pattern. Pt is given CGA for t/f and ambulation, but he uses the bed rail and performs bed mobility with supervision. Continues to have gait abnormalities. HHPT recommended.    Discharge Assessment:    Vital Signs  Visit Vitals  /62   Pulse 103   Temp 98.4 °F (36.9 °C) (Oral)   Resp 16   Ht 170.2 cm (67\")   Wt 109 kg (241 lb)   SpO2 94%   BMI 37.75 kg/m²     Temp (24hrs), Av.2 °F (36.8 °C), Min:97.6 °F (36.4 °C), Max:98.6 °F (37 °C)      General Appearance:    Alert, cooperative, in no acute distress   Lungs:     Clear to auscultation,respirations regular, even and                   unlabored    Heart:    Regular rhythm and normal rate, normal S1 and S2, no            murmur, no gallop, no rub, no click   Abdomen:     Normal bowel sounds, no masses, no organomegaly, soft        non-tender, non-distended, no guarding, no rebound                 tenderness   Extremities:   Moves all extremities well, no edema, no cyanosis, " no              redness   Pulses:   Pulses palpable and equal bilaterally   Skin:   No bleeding, bruising or rash. Back dressing CDI.   Neurologic:   Cranial nerves 2 - 12 grossly intact, sensation intact, DTR        present and equal bilaterally. Flexion and dorsiflexion intact bilateral feet.       Discharge Disposition: Home    Discharge Medications     Discharge Medications      New Medications      Instructions Start Date   docusate sodium 100 MG capsule  Commonly known as:  COLACE   100 mg, Oral, 2 Times Daily      oxyCODONE-acetaminophen  MG per tablet  Commonly known as:  PERCOCET   1 tablet, Oral, Every 4 Hours PRN         Continue These Medications      Instructions Start Date   allopurinol 300 MG tablet  Commonly known as:  ZYLOPRIM   300 mg, Oral, Daily      cyclobenzaprine 10 MG tablet  Commonly known as:  FLEXERIL   10 mg, Oral, 3 Times Daily PRN      diphenhydrAMINE 25 mg capsule  Commonly known as:  BENADRYL   50 mg, Oral, Every 6 Hours PRN      DULoxetine 60 MG capsule  Commonly known as:  CYMBALTA   60 mg, Oral, Daily      fluticasone 50 MCG/ACT nasal spray  Commonly known as:  FLONASE   2 sprays, Nasal, Daily      levothyroxine 50 MCG tablet  Commonly known as:  SYNTHROID, LEVOTHROID   50 mcg, Oral, Daily      lisinopril 10 MG tablet  Commonly known as:  PRINIVIL,ZESTRIL   10 mg, Oral, Daily      LORATADINE PO   10 mg, Oral, Daily      metoprolol tartrate 100 MG tablet  Commonly known as:  LOPRESSOR   100 mg, Oral, 2 Times Daily      MULTIVITAMIN ADULT PO   1 capsule, Oral, Daily      ondansetron 4 MG tablet  Commonly known as:  ZOFRAN   4 mg, Oral, Every 8 Hours PRN      simvastatin 20 MG tablet  Commonly known as:  ZOCOR   20 mg, Oral, Daily         Stop These Medications    colchicine 0.6 MG capsule capsule            Discharge Diet:   Diet Instructions     Regular diet                 Activity at Discharge: ambulate    Follow-up Appointments  Dr. Valdez per his orders    Discharge took  over 30 min     Lily Espinoza MD  06/22/19  9:08 AM

## 2022-04-22 ENCOUNTER — TRANSCRIBE ORDERS (OUTPATIENT)
Dept: ADMINISTRATIVE | Facility: HOSPITAL | Age: 57
End: 2022-04-22

## 2022-04-22 DIAGNOSIS — G25.2 OTHER SPECIFIED FORMS OF TREMOR: Primary | ICD-10-CM

## 2022-06-03 ENCOUNTER — HOSPITAL ENCOUNTER (OUTPATIENT)
Dept: NEUROLOGY | Facility: HOSPITAL | Age: 57
Discharge: HOME | End: 2022-06-03
Admitting: ORTHOPAEDIC SURGERY

## 2022-06-03 DIAGNOSIS — G25.2 OTHER SPECIFIED FORMS OF TREMOR: ICD-10-CM

## 2022-06-03 PROCEDURE — 95886 MUSC TEST DONE W/N TEST COMP: CPT

## 2022-06-03 PROCEDURE — 95910 NRV CNDJ TEST 7-8 STUDIES: CPT

## (undated) DEVICE — 450 ML BOTTLE OF 0.05% CHLORHEXIDINE GLUCONATE IN 99.95% STERILE WATER FOR IRRIGATION, USP AND APPLICATOR.: Brand: IRRISEPT ANTIMICROBIAL WOUND LAVAGE

## (undated) DEVICE — DRSNG WND GZ PAD BORDERED 4X8IN STRL

## (undated) DEVICE — GOWN,REINF,POLY,ECL,PP SLV,XL: Brand: MEDLINE

## (undated) DEVICE — NDL HYPO ECLPS SFTY 22G 1 1/2IN

## (undated) DEVICE — DRSNG PAD ABD 8X10IN STRL

## (undated) DEVICE — GLV SURG SENSICARE W/ALOE PF LF 9 STRL

## (undated) DEVICE — OIL CARTRIDGE: Brand: CORE, MAESTRO

## (undated) DEVICE — PROXIMATE RH ROTATING HEAD SKIN STAPLERS (35 WIDE) CONTAINS 35 STAINLESS STEEL STAPLES: Brand: PROXIMATE

## (undated) DEVICE — MEDI-VAC YANKAUER SUCTION HANDLE: Brand: CARDINAL HEALTH

## (undated) DEVICE — PK SPINE ORTHO 10

## (undated) DEVICE — DIFFUSER: Brand: CORE, MAESTRO

## (undated) DEVICE — GAUZE,SPONGE,4"X4",16PLY,XRAY,STRL,LF: Brand: MEDLINE

## (undated) DEVICE — 2963 MEDIPORE SOFT CLOTH TAPE 3 IN X 10 YD 12 RLS/CS: Brand: 3M™ MEDIPORE™

## (undated) DEVICE — 3.0MM PRECISION NEURO (MATCH HEAD)

## (undated) DEVICE — DISPOSABLE BIPOLAR FORCEPS 7 3/4" (19.7CM) SCOVILLE BAYONET, INSULATED, 1.5MM TIP AND 12 FT. (3.6M) CABLE: Brand: KIRWAN

## (undated) DEVICE — ADAPT LUER STUB INTRAMEDIC PE/200 17G

## (undated) DEVICE — DRAPE,REIN 53X77,STERILE: Brand: MEDLINE

## (undated) DEVICE — JACKSON TABLE POSITIONER KIT: Brand: MEDLINE INDUSTRIES, INC.

## (undated) DEVICE — THE MILL DISPOSABLE - MEDIUM

## (undated) DEVICE — 3M™ STERI-STRIP™ REINFORCED ADHESIVE SKIN CLOSURES, R1547, 1/2 IN X 4 IN (12 MM X 100 MM), 6 STRIPS/ENVELOPE: Brand: 3M™ STERI-STRIP™

## (undated) DEVICE — ANTIBACTERIAL UNDYED BRAIDED (POLYGLACTIN 910), SYNTHETIC ABSORBABLE SUTURE: Brand: COATED VICRYL

## (undated) DEVICE — SUCTION CANISTER, 2500CC, RIGID: Brand: DEROYAL

## (undated) DEVICE — INTENDED USE FOR SURGICAL MARKING ON INTACT SKIN, ALSO PROVIDES A PERMANENT METHOD OF IDENTIFYING OBJECTS IN THE OPERATING ROOM: Brand: WRITESITE® REGULAR TIP SKIN MARKER

## (undated) DEVICE — PAD ARMBRD SURG CONVOL 7.5X20X2IN

## (undated) DEVICE — GOWN,REINF,POLY,ECL,PP SLV,3XL,XLONG: Brand: MEDLINE

## (undated) DEVICE — SCRW VIPER INNR ST: Type: IMPLANTABLE DEVICE | Status: NON-FUNCTIONAL

## (undated) DEVICE — DRP C/ARMOR

## (undated) DEVICE — CVR HNDL LT SURG ACCSSRY BLU STRL

## (undated) DEVICE — KT DRN EVAC WND PVC PCH WTROC RND 10F400

## (undated) DEVICE — GOWN,PREVENTION PLUS,XXLARGE,STERILE: Brand: MEDLINE

## (undated) DEVICE — ADHS LIQ MASTISOL 2/3ML

## (undated) DEVICE — SPNG GZ STRL 2S 4X4 12PLY

## (undated) DEVICE — SYR LL 10ML LF

## (undated) DEVICE — SNAP KOVER: Brand: UNBRANDED